# Patient Record
Sex: MALE | Race: OTHER | HISPANIC OR LATINO
[De-identification: names, ages, dates, MRNs, and addresses within clinical notes are randomized per-mention and may not be internally consistent; named-entity substitution may affect disease eponyms.]

---

## 2023-03-20 PROBLEM — Z00.00 ENCOUNTER FOR PREVENTIVE HEALTH EXAMINATION: Status: ACTIVE | Noted: 2023-03-20

## 2023-03-22 ENCOUNTER — RESULT REVIEW (OUTPATIENT)
Age: 63
End: 2023-03-22

## 2023-03-22 ENCOUNTER — APPOINTMENT (OUTPATIENT)
Dept: ORTHOPEDIC SURGERY | Facility: CLINIC | Age: 63
End: 2023-03-22
Payer: MEDICAID

## 2023-03-22 ENCOUNTER — OUTPATIENT (OUTPATIENT)
Dept: OUTPATIENT SERVICES | Facility: HOSPITAL | Age: 63
LOS: 1 days | End: 2023-03-22
Payer: MEDICAID

## 2023-03-22 VITALS
OXYGEN SATURATION: 97 % | SYSTOLIC BLOOD PRESSURE: 120 MMHG | DIASTOLIC BLOOD PRESSURE: 70 MMHG | BODY MASS INDEX: 29.19 KG/M2 | HEIGHT: 67 IN | HEART RATE: 66 BPM | WEIGHT: 186 LBS

## 2023-03-22 DIAGNOSIS — Z78.9 OTHER SPECIFIED HEALTH STATUS: ICD-10-CM

## 2023-03-22 DIAGNOSIS — Z01.818 ENCOUNTER FOR OTHER PREPROCEDURAL EXAMINATION: ICD-10-CM

## 2023-03-22 DIAGNOSIS — Z86.39 PERSONAL HISTORY OF OTHER ENDOCRINE, NUTRITIONAL AND METABOLIC DISEASE: ICD-10-CM

## 2023-03-22 DIAGNOSIS — Z60.2 PROBLEMS RELATED TO LIVING ALONE: ICD-10-CM

## 2023-03-22 LAB
A1C WITH ESTIMATED AVERAGE GLUCOSE RESULT: 5.6 % — SIGNIFICANT CHANGE UP (ref 4–5.6)
ALBUMIN SERPL ELPH-MCNC: 4.9 G/DL — SIGNIFICANT CHANGE UP (ref 3.3–5)
ALP SERPL-CCNC: 111 U/L — SIGNIFICANT CHANGE UP (ref 40–120)
ALT FLD-CCNC: 17 U/L — SIGNIFICANT CHANGE UP (ref 10–45)
ANION GAP SERPL CALC-SCNC: 11 MMOL/L — SIGNIFICANT CHANGE UP (ref 5–17)
APPEARANCE UR: CLEAR — SIGNIFICANT CHANGE UP
APTT BLD: 29.6 SEC — SIGNIFICANT CHANGE UP (ref 27.5–35.5)
AST SERPL-CCNC: 18 U/L — SIGNIFICANT CHANGE UP (ref 10–40)
BASOPHILS # BLD AUTO: 0.03 K/UL — SIGNIFICANT CHANGE UP (ref 0–0.2)
BASOPHILS NFR BLD AUTO: 0.6 % — SIGNIFICANT CHANGE UP (ref 0–2)
BILIRUB SERPL-MCNC: 0.3 MG/DL — SIGNIFICANT CHANGE UP (ref 0.2–1.2)
BILIRUB UR-MCNC: NEGATIVE — SIGNIFICANT CHANGE UP
BUN SERPL-MCNC: 20 MG/DL — SIGNIFICANT CHANGE UP (ref 7–23)
CALCIUM SERPL-MCNC: 10 MG/DL — SIGNIFICANT CHANGE UP (ref 8.4–10.5)
CHLORIDE SERPL-SCNC: 105 MMOL/L — SIGNIFICANT CHANGE UP (ref 96–108)
CO2 SERPL-SCNC: 27 MMOL/L — SIGNIFICANT CHANGE UP (ref 22–31)
COLOR SPEC: YELLOW — SIGNIFICANT CHANGE UP
CREAT SERPL-MCNC: 0.98 MG/DL — SIGNIFICANT CHANGE UP (ref 0.5–1.3)
DIFF PNL FLD: NEGATIVE — SIGNIFICANT CHANGE UP
EGFR: 88 ML/MIN/1.73M2 — SIGNIFICANT CHANGE UP
EOSINOPHIL # BLD AUTO: 0.09 K/UL — SIGNIFICANT CHANGE UP (ref 0–0.5)
EOSINOPHIL NFR BLD AUTO: 1.7 % — SIGNIFICANT CHANGE UP (ref 0–6)
ESTIMATED AVERAGE GLUCOSE: 114 MG/DL — SIGNIFICANT CHANGE UP (ref 68–114)
GLUCOSE SERPL-MCNC: 102 MG/DL — HIGH (ref 70–99)
GLUCOSE UR QL: NEGATIVE — SIGNIFICANT CHANGE UP
HCT VFR BLD CALC: 42.9 % — SIGNIFICANT CHANGE UP (ref 39–50)
HGB BLD-MCNC: 14.3 G/DL — SIGNIFICANT CHANGE UP (ref 13–17)
IMM GRANULOCYTES NFR BLD AUTO: 0.2 % — SIGNIFICANT CHANGE UP (ref 0–0.9)
INR BLD: 0.98 — SIGNIFICANT CHANGE UP (ref 0.88–1.16)
KETONES UR-MCNC: NEGATIVE — SIGNIFICANT CHANGE UP
LEUKOCYTE ESTERASE UR-ACNC: NEGATIVE — SIGNIFICANT CHANGE UP
LYMPHOCYTES # BLD AUTO: 1.29 K/UL — SIGNIFICANT CHANGE UP (ref 1–3.3)
LYMPHOCYTES # BLD AUTO: 23.7 % — SIGNIFICANT CHANGE UP (ref 13–44)
MCHC RBC-ENTMCNC: 29.8 PG — SIGNIFICANT CHANGE UP (ref 27–34)
MCHC RBC-ENTMCNC: 33.3 GM/DL — SIGNIFICANT CHANGE UP (ref 32–36)
MCV RBC AUTO: 89.4 FL — SIGNIFICANT CHANGE UP (ref 80–100)
MONOCYTES # BLD AUTO: 0.42 K/UL — SIGNIFICANT CHANGE UP (ref 0–0.9)
MONOCYTES NFR BLD AUTO: 7.7 % — SIGNIFICANT CHANGE UP (ref 2–14)
NEUTROPHILS # BLD AUTO: 3.6 K/UL — SIGNIFICANT CHANGE UP (ref 1.8–7.4)
NEUTROPHILS NFR BLD AUTO: 66.1 % — SIGNIFICANT CHANGE UP (ref 43–77)
NITRITE UR-MCNC: NEGATIVE — SIGNIFICANT CHANGE UP
NRBC # BLD: 0 /100 WBCS — SIGNIFICANT CHANGE UP (ref 0–0)
PH UR: 5 — SIGNIFICANT CHANGE UP (ref 5–8)
PLATELET # BLD AUTO: 237 K/UL — SIGNIFICANT CHANGE UP (ref 150–400)
POTASSIUM SERPL-MCNC: 5.2 MMOL/L — SIGNIFICANT CHANGE UP (ref 3.5–5.3)
POTASSIUM SERPL-SCNC: 5.2 MMOL/L — SIGNIFICANT CHANGE UP (ref 3.5–5.3)
PROT SERPL-MCNC: 7.5 G/DL — SIGNIFICANT CHANGE UP (ref 6–8.3)
PROT UR-MCNC: NEGATIVE MG/DL — SIGNIFICANT CHANGE UP
PROTHROM AB SERPL-ACNC: 11.6 SEC — SIGNIFICANT CHANGE UP (ref 10.5–13.4)
RBC # BLD: 4.8 M/UL — SIGNIFICANT CHANGE UP (ref 4.2–5.8)
RBC # FLD: 12.9 % — SIGNIFICANT CHANGE UP (ref 10.3–14.5)
SODIUM SERPL-SCNC: 143 MMOL/L — SIGNIFICANT CHANGE UP (ref 135–145)
SP GR SPEC: 1.02 — SIGNIFICANT CHANGE UP (ref 1–1.03)
UROBILINOGEN FLD QL: 0.2 E.U./DL — SIGNIFICANT CHANGE UP
WBC # BLD: 5.44 K/UL — SIGNIFICANT CHANGE UP (ref 3.8–10.5)
WBC # FLD AUTO: 5.44 K/UL — SIGNIFICANT CHANGE UP (ref 3.8–10.5)

## 2023-03-22 PROCEDURE — 99205 OFFICE O/P NEW HI 60 MIN: CPT

## 2023-03-22 PROCEDURE — 73590 X-RAY EXAM OF LOWER LEG: CPT | Mod: 26,LT

## 2023-03-22 PROCEDURE — 85025 COMPLETE CBC W/AUTO DIFF WBC: CPT

## 2023-03-22 PROCEDURE — 93010 ELECTROCARDIOGRAM REPORT: CPT | Mod: NC

## 2023-03-22 PROCEDURE — 85610 PROTHROMBIN TIME: CPT

## 2023-03-22 PROCEDURE — 71046 X-RAY EXAM CHEST 2 VIEWS: CPT

## 2023-03-22 PROCEDURE — 87086 URINE CULTURE/COLONY COUNT: CPT

## 2023-03-22 PROCEDURE — 81003 URINALYSIS AUTO W/O SCOPE: CPT

## 2023-03-22 PROCEDURE — 73564 X-RAY EXAM KNEE 4 OR MORE: CPT

## 2023-03-22 PROCEDURE — 80053 COMPREHEN METABOLIC PANEL: CPT

## 2023-03-22 PROCEDURE — 83036 HEMOGLOBIN GLYCOSYLATED A1C: CPT

## 2023-03-22 PROCEDURE — 73590 X-RAY EXAM OF LOWER LEG: CPT

## 2023-03-22 PROCEDURE — 73564 X-RAY EXAM KNEE 4 OR MORE: CPT | Mod: 26,LT,RT

## 2023-03-22 PROCEDURE — 85730 THROMBOPLASTIN TIME PARTIAL: CPT

## 2023-03-22 PROCEDURE — 93005 ELECTROCARDIOGRAM TRACING: CPT

## 2023-03-22 PROCEDURE — 71046 X-RAY EXAM CHEST 2 VIEWS: CPT | Mod: 26

## 2023-03-22 SDOH — SOCIAL STABILITY - SOCIAL INSECURITY: PROBLEMS RELATED TO LIVING ALONE: Z60.2

## 2023-03-23 PROBLEM — Z78.9 NON-SMOKER: Status: ACTIVE | Noted: 2023-03-22

## 2023-03-23 PROBLEM — Z86.39 HISTORY OF DIABETES MELLITUS: Status: RESOLVED | Noted: 2023-03-22 | Resolved: 2023-03-23

## 2023-03-23 PROBLEM — Z60.2 LIVES ALONE: Status: ACTIVE | Noted: 2023-03-22

## 2023-03-23 LAB
CULTURE RESULTS: SIGNIFICANT CHANGE UP
SPECIMEN SOURCE: SIGNIFICANT CHANGE UP

## 2023-03-23 RX ORDER — IBUPROFEN 800 MG/1
TABLET, FILM COATED ORAL
Refills: 0 | Status: ACTIVE | COMMUNITY

## 2023-03-23 NOTE — HISTORY OF PRESENT ILLNESS
[8] : a current pain level of 8/10 [Walking] : worsened by walking [NSAIDs] : relieved by nonsteroidal anti-inflammatory drugs [Rest] : relieved by rest [de-identified] : 03/22/2023: 63 y/o [place] speaking male presenting for evaluation of right knee pain. He has been having this pain for the past 5 years and it is progressively worsening. He rates his pain as 8/10. He localizes the pain to the entire knee, worse with walking, and is taking Tylenol and ibuprofen as needed for pain. He notes a walking distance limitation of up to 3 blocks with a cane before having to stop and take a break. Past interventions include PT over 2 years ago which did not provide good relief. He has had 3 right knee CSI injections 2 years ago which also did not provide much relief. He would like to discuss right TKA today.\par \par PMHx includes left knee fracture 3 months ago following a misstep on stairs, prediabetes. He denies allergies and he currently lives alone in a house.  [de-identified] : pt states pain is achy

## 2023-03-23 NOTE — PHYSICAL EXAM
[de-identified] :  General appearance: well nourished and hydrated, pleasant, alert and oriented x 3, cooperative.  \par HEENT: normocephalic, EOM intact, wearing mask, external auditory canal clear.  \par Cardiovascular: no lower leg edema, no varicosities, dorsalis pedis pulses palpable and symmetric.  \par Lymphatics: no palpable lymphadenopathy, no lymphedema.  \par Neurologic: sensation is normal, no muscle weakness in upper or lower extremities, patella tendon reflexes present and symmetric.  \par Dermatologic: skin moist, warm, no rash.  \par Spine: cervical spine with normal lordosis and painless range of motion, thoracic spine with normal kyphosis and painless range of motion, lumbosacral spine with normal lordosis and painless range of motion.  No tenderness to palpation along midline spine and paraspinal musculature.  Sacroiliac joints nontender bilaterally. Negative SLR and crossed SLR tests bilaterally.\par Gait: presents using a cane and displays b/l varus thrust, more pronounced on the right as well as right sided antalgia\par \par Left knee: \par - Focal soft tissue swelling: none\par - Ecchymosis: none\par - Erythema: none\par - Effusion: none, no Baker's cyst\par - Wounds: well healed suprapatellar nailing incision as well as stab incisions for the interlocking screws, all well healed and benign appearing\par - Alignment: marked varus\par - Tenderness: none\par - ROM: 0-120\par - Collateral laxity: none\par - Cruciate laxity: none\par - Popliteal angle (degrees): 10\par - Quad strength: 5/5\par \par Right knee:\par - Focal soft tissue swelling: none\par - Ecchymosis: none\par - Erythema: none\par - Effusion: none, no Baker's cyst\par - Wounds: none\par - Alignment: marked varus\par - Tenderness: none\par - ROM: 5-110\par - Collateral laxity: pseudolaxity to varus\par - Cruciate laxity: none\par - Popliteal angle (degrees): 25\par - Quad strength: 5/5 [de-identified] : 4 radiographic views were taken of the b/l knees which demonstrate for the right knee marked varus malalignment, tricompartmental OA most pronounced medially with bone on bone articulation, KL 4, with an associated osteochondral defect of the medial femoral condyle with flattening of the condyle and a sharp transition zone. Patella sits at normal height and tracks centrally. The patella does appear to be bipartite superolateral. Left knee demonstrates also marked varus malalignment with lateral tibial subluxation, tricompartmental OA most pronounced medially with bone on bone articulation, KL 4. Patella sits at normal height and tracks centrally, also bipartite at the superolateral aspect. An intramedullary rosanna is visualized within the tibia with 2 proximal interfering screws. The rosanna appears to be in normal position without evidence of hardware complication. \par \par AP and lateral views were also taken of the left tibia/fibula. This demonstrates the rest of the tibial nail which appears to be in good position without evidence of mechanical complication. A spiral fracture of the distal half of the tibial diaphysis is noted, well aligned with ossifying callus.

## 2023-03-23 NOTE — ADDENDUM
[FreeTextEntry1] : Documented by Liz Zavala acting as a scribe for Dr. Nickolas Sosa on 03/22/2023.

## 2023-03-23 NOTE — END OF VISIT
[FreeTextEntry3] : All medical record entries made by the Scribe were at my, Dr. Nickolas Sosa, direction and personally dictated by me on 03/22/2023. I have reviewed the chart and agree that the record accurately reflects my personal performance of the history, physical exam, assessment and plan. I have also personally directed, reviewed, and agreed with the chart.

## 2023-03-23 NOTE — DISCUSSION/SUMMARY
[de-identified] : 63 y/o male with b/l knee severe osteoarthritis, only right side symptomatic at this time, with associated right medial femoral condyle osteochondral defect and 3 months s/p left tibia intramedullary nailing for fracture. \par - He is indicated at this time for right total knee arthroplasty with Nelsy robotic assistance. \par - We discussed the details of the procedure, the expected recovery period, and the expected outcome. We discussed the likelihood of satisfaction after complete recovery, and the potential causes of dissatisfaction. The importance of active patient participation in the rehabilitation protocol was emphasized, along with its influence on short and long-term outcomes. Specific risks of total knee replacement were discussed in detail. We discussed the risk of surgical site complications including but not limited to: surgical site infection, wound healing complications, bone fracture, tendon or ligament injury, neurovascular injury, hemorrhage, postoperative stiffness or instability, persistent pain and need for reoperation or manipulation under anesthesia. We discussed surgical blood loss and the possible need for blood transfusion. We discussed the risk of perioperative medical complications, including but not limited to catheter-associated urinary tract infection, venous thromboembolism and other cardiopulmonary complications. We discussed anesthetic options and the risk of anesthesia-related complications. We discussed implant fixation methods; my plan would be to use fully cemented fixation in this case. We discussed the variable need to resurface the patella; my plan would be to resurface in this case. We discussed the durability of prosthetic knees and limitations related to wear, osteolysis and loosening.  We discussed the role of the robot in helping to guide bone resections and measure alignment and soft tissue balance. All questions were answered the patient's satisfaction. The patient was given a copy of my preoperative packet with additional information about the procedure. I asked the patient to either call back or schedule a followup appointment for any additional questions or concerns regarding the procedure. \par - We discussed that given his history of lifelong constitutional varus, I would aim to correct approximately 75% of his current varus deformity which would still be straighter than the left knee. \par - We reviewed that his left knee is currently minimally symptomatic and I would not consider arthroplasty at this time, but given the bone on bone arthritis, we would continue to observe this over time and he may become a candidate for left total knee arthroplasty if symptoms progress from here. \par - He expressed a strong desire to go to subacute rehabilitation post surgery, citing that he lives alone by himself on a second floor apartment and has very little income. He had a good experience in [facility] from his left tibia recovery and he would like to try to get back into the same facility. I counseled him that I would recommend against subacute rehabilitation, however given his strenuous and repeated requests, we will also revisit this with the hospital  postop. \par - He will be booked for surgery at a convenient time with routine medical clearance. He is a [place] speaker and will not be a candidate for any of the current clinical trials.

## 2023-03-27 ENCOUNTER — APPOINTMENT (OUTPATIENT)
Dept: ORTHOPEDIC SURGERY | Facility: CLINIC | Age: 63
End: 2023-03-27
Payer: MEDICAID

## 2023-03-27 VITALS
OXYGEN SATURATION: 99 % | HEART RATE: 65 BPM | TEMPERATURE: 98.6 F | HEIGHT: 67 IN | DIASTOLIC BLOOD PRESSURE: 79 MMHG | SYSTOLIC BLOOD PRESSURE: 134 MMHG | BODY MASS INDEX: 29.19 KG/M2 | WEIGHT: 186 LBS

## 2023-03-27 PROCEDURE — 99213 OFFICE O/P EST LOW 20 MIN: CPT

## 2023-03-28 NOTE — ASSESSMENT
[Patient Optimized for Surgery] : Patient optimized for surgery [No Further Testing Recommended] : no further testing recommended [As per surgery] : as per surgery [FreeTextEntry4] : KWAKU POST is a 62 year old male presents for preoperative medical evaluation: \par Fowler score 0\par RCRI class I - 3.9% 30-day risk of death, MI or cardiac arrest\par 7-10 METs\par Labs: CBC, CMP, Coags, A1c all wnl (3/22/23)\par ECG; NSR 62 bpm (3/22/23)\par CXR: Biapical nodular pleural thickening thought to be postinflammatory and chronic.  Linear opacity over the lateral aspect of the right upper lung zone compatible with a focus of scarring and/or atelectasis.\par There is some mild medial diaphragm elevation.  No consolidation.  There are no pleural effusions.  There is no pneumothorax.  The cardiomediastinal silhouette, bones and soft tissues are unremarkable. (3/22/23)\par \par Given the above, the patient is medically optimized for surgery.\par \par Plan: \par Avoid Intra-/alexandria-operative hypotension.\par Prophylactic antibiotics recommended.\par Stop Ibuprofen, vitamins, supplements and herbal therapies at least 5 days prior to procedure. \par Ok to c/w acetaminophen prn.\par DVT prophylaxis and early mobilization is recommended.\par Early use of incentive spirometry recommended.\par All questions answered and patient verbalized understanding.

## 2023-03-28 NOTE — PHYSICAL EXAM
[No Acute Distress] : no acute distress [Well Nourished] : well nourished [Well Developed] : well developed [Well-Appearing] : well-appearing [Normal Sclera/Conjunctiva] : normal sclera/conjunctiva [PERRL] : pupils equal round and reactive to light [EOMI] : extraocular movements intact [Normal Outer Ear/Nose] : the outer ears and nose were normal in appearance [Normal Oropharynx] : the oropharynx was normal [No JVD] : no jugular venous distention [No Lymphadenopathy] : no lymphadenopathy [Supple] : supple [Thyroid Normal, No Nodules] : the thyroid was normal and there were no nodules present [No Respiratory Distress] : no respiratory distress  [No Accessory Muscle Use] : no accessory muscle use [Clear to Auscultation] : lungs were clear to auscultation bilaterally [Normal Rate] : normal rate  [Regular Rhythm] : with a regular rhythm [Normal S1, S2] : normal S1 and S2 [No Murmur] : no murmur heard [No Carotid Bruits] : no carotid bruits [No Abdominal Bruit] : a ~M bruit was not heard ~T in the abdomen [No Varicosities] : no varicosities [Pedal Pulses Present] : the pedal pulses are present [No Edema] : there was no peripheral edema [No Palpable Aorta] : no palpable aorta [No Extremity Clubbing/Cyanosis] : no extremity clubbing/cyanosis [Soft] : abdomen soft [Non Tender] : non-tender [Non-distended] : non-distended [No Masses] : no abdominal mass palpated [No HSM] : no HSM [Normal Bowel Sounds] : normal bowel sounds [Normal Posterior Cervical Nodes] : no posterior cervical lymphadenopathy [Normal Anterior Cervical Nodes] : no anterior cervical lymphadenopathy [No CVA Tenderness] : no CVA  tenderness [No Spinal Tenderness] : no spinal tenderness [No Rash] : no rash [Coordination Grossly Intact] : coordination grossly intact [No Focal Deficits] : no focal deficits [Normal Gait] : normal gait [Deep Tendon Reflexes (DTR)] : deep tendon reflexes were 2+ and symmetric [Normal Affect] : the affect was normal [Normal Insight/Judgement] : insight and judgment were intact [de-identified] : Gait antalgic, genu varus bilaterally, medial joint  to palpation, ROM 5-95 with pain at end of ROM

## 2023-03-28 NOTE — HISTORY OF PRESENT ILLNESS
[No Pertinent Cardiac History] : no history of aortic stenosis, atrial fibrillation, coronary artery disease, recent myocardial infarction, or implantable device/pacemaker [No Pertinent Pulmonary History] : no history of asthma, COPD, sleep apnea, or smoking [No Adverse Anesthesia Reaction] : no adverse anesthesia reaction in self or family member [(Patient denies any chest pain, claudication, dyspnea on exertion, orthopnea, palpitations or syncope)] : Patient denies any chest pain, claudication, dyspnea on exertion, orthopnea, palpitations or syncope [Good (7-10 METs)] : Good (7-10 METs) [Chronic Anticoagulation] : no chronic anticoagulation [Chronic Kidney Disease] : no chronic kidney disease [Diabetes] : no diabetes [NSAIDs: _____] : NSAIDs: [unfilled] [FreeTextEntry1] : Right total knee arthroplasty [FreeTextEntry2] : 4/10/23 [FreeTextEntry3] : Dr. Nickolas Sosa [FreeTextEntry4] : KWAKU POST is a 62 year old male w/ PMHx left tibia fracture 2022 s/p ORIF and bilateral kneeOA who presents for preprocedural evaluation. \par \par Lives with 3 roommates in second floor in government housing and gets meals from the local mission.\par Plans to have inpatient rehab at [facility] [FreeTextEntry8] : Limited by knee pain

## 2023-04-07 VITALS
WEIGHT: 173.72 LBS | HEIGHT: 67 IN | RESPIRATION RATE: 16 BRPM | SYSTOLIC BLOOD PRESSURE: 118 MMHG | HEART RATE: 70 BPM | TEMPERATURE: 98 F | DIASTOLIC BLOOD PRESSURE: 65 MMHG

## 2023-04-07 RX ORDER — ASPIRIN 81 MG/1
81 TABLET ORAL
Qty: 60 | Refills: 0 | Status: ACTIVE | COMMUNITY
Start: 2023-04-07 | End: 1900-01-01

## 2023-04-07 RX ORDER — POVIDONE-IODINE 5 %
1 AEROSOL (ML) TOPICAL ONCE
Refills: 0 | Status: COMPLETED | OUTPATIENT
Start: 2023-04-10 | End: 2023-04-10

## 2023-04-07 RX ORDER — OXYCODONE 5 MG/1
5 TABLET ORAL
Qty: 50 | Refills: 0 | Status: ACTIVE | COMMUNITY
Start: 2023-04-07 | End: 1900-01-01

## 2023-04-07 RX ORDER — MORPHINE SULFATE 15 MG/1
15 TABLET, FILM COATED, EXTENDED RELEASE ORAL
Qty: 28 | Refills: 0 | Status: ACTIVE | COMMUNITY
Start: 2023-04-07 | End: 1900-01-01

## 2023-04-07 RX ORDER — ACETAMINOPHEN 500 MG/1
500 TABLET ORAL
Qty: 180 | Refills: 2 | Status: ACTIVE | COMMUNITY
Start: 2023-04-07 | End: 1900-01-01

## 2023-04-07 RX ORDER — PANTOPRAZOLE 40 MG/1
40 TABLET, DELAYED RELEASE ORAL DAILY
Qty: 30 | Refills: 2 | Status: ACTIVE | COMMUNITY
Start: 2023-04-07 | End: 1900-01-01

## 2023-04-07 NOTE — H&P ADULT - NSICDXPASTMEDICALHX_GEN_ALL_CORE_FT
PAST MEDICAL HISTORY:  Diabetes     Osteoarthritis      PAST MEDICAL HISTORY:  Osteoarthritis     Prediabetes

## 2023-04-07 NOTE — H&P ADULT - PROBLEM SELECTOR PLAN 1
Admit to Orthopaedic Service.  Presents today for elective right total knee arthroplasty  Pt medically stable and cleared for procedure today by Dr. Wilks

## 2023-04-07 NOTE — H&P ADULT - HISTORY OF PRESENT ILLNESS
61yo M with right knee pain x     Presents today for elective right total knee arthroplasty History provided with help of  [id number].    63yo M with right knee pain x several years. He does reports recent injury to the LEFT LEG, with ORIF for the left tibia at [facility] in December. He was on Lovenox postoperatively which he has not taken since February. For the right knee, he has tried injections, PT, medications, rest and time without relief. He denies numbness or tingling. He does currently use a cane.    Denies PMHx of DM2, DVT/PE, MI or stroke.     Presents today for elective right total knee arthroplasty

## 2023-04-07 NOTE — ASU PATIENT PROFILE, ADULT - FALL HARM RISK - RISK INTERVENTIONS

## 2023-04-07 NOTE — H&P ADULT - NSHPLABSRESULTS_GEN_ALL_CORE
Preop CBC, BMP, PT/INR, PTT within normal range and reviewed per medical clearance  Cr- 0.98  A1c: 5.6  UA: negative  Preop CXR 3/22 reviewed per medical clearance   Preop EKG 3/22 HR: 72bpm, NSR, reviewed per medical clearance  3M: MELLO  COVID: negative 4/5/23

## 2023-04-07 NOTE — H&P ADULT - NSHPPHYSICALEXAM_GEN_ALL_CORE
MSK: Decreased right knee ROM secondary to pain      Rest of PE per MD clearance Gen: 62M NAD  MSK: Decreased lumbar ROM secondary to pain  LE skin without erythema, ecchymosis, abrasions or lesions, signs of infection; left leg well healed surgical incisions  Calves soft, nontender bilaterally   Sensation intact to light touch bilateral lower extremities  Pulses: DP2+ bilat LE; brisk capillary refill  EHL/FHL/TA/GS 5/5 bilaterally   AIN/PIN/ulnar/median/radial intact bilaterally    Rest of PE per MD clearance

## 2023-04-07 NOTE — ASU PATIENT PROFILE, ADULT - FALL HARM RISK - UNIVERSAL INTERVENTIONS
Bed in lowest position, wheels locked, appropriate side rails in place/Call bell, personal items and telephone in reach/Instruct patient to call for assistance before getting out of bed or chair/Non-slip footwear when patient is out of bed/[place] to call system/Physically safe environment - no spills, clutter or unnecessary equipment/Purposeful Proactive Rounding/Room/bathroom lighting operational, light cord in reach

## 2023-04-09 ENCOUNTER — TRANSCRIPTION ENCOUNTER (OUTPATIENT)
Age: 63
End: 2023-04-09

## 2023-04-10 ENCOUNTER — APPOINTMENT (OUTPATIENT)
Dept: ORTHOPEDIC SURGERY | Facility: HOSPITAL | Age: 63
End: 2023-04-10

## 2023-04-10 ENCOUNTER — RESULT REVIEW (OUTPATIENT)
Age: 63
End: 2023-04-10

## 2023-04-10 ENCOUNTER — INPATIENT (INPATIENT)
Facility: HOSPITAL | Age: 63
LOS: 3 days | Discharge: NURSING, REHAB OR LONG-TERM CARE | DRG: 470 | End: 2023-04-14
Attending: ORTHOPAEDIC SURGERY | Admitting: ORTHOPAEDIC SURGERY
Payer: MEDICAID

## 2023-04-10 DIAGNOSIS — M19.90 UNSPECIFIED OSTEOARTHRITIS, UNSPECIFIED SITE: ICD-10-CM

## 2023-04-10 DIAGNOSIS — Z98.890 OTHER SPECIFIED POSTPROCEDURAL STATES: Chronic | ICD-10-CM

## 2023-04-10 DIAGNOSIS — E11.9 TYPE 2 DIABETES MELLITUS WITHOUT COMPLICATIONS: ICD-10-CM

## 2023-04-10 LAB — GLUCOSE BLDC GLUCOMTR-MCNC: 97 MG/DL — SIGNIFICANT CHANGE UP (ref 70–99)

## 2023-04-10 PROCEDURE — 27447 TOTAL KNEE ARTHROPLASTY: CPT | Mod: RT

## 2023-04-10 PROCEDURE — S2900 ROBOTIC SURGICAL SYSTEM: CPT | Mod: NC

## 2023-04-10 PROCEDURE — 73560 X-RAY EXAM OF KNEE 1 OR 2: CPT | Mod: 26,RT

## 2023-04-10 DEVICE — FEM PERSONA PS CMT CCR STD SZ 9 R: Type: IMPLANTABLE DEVICE | Status: FUNCTIONAL

## 2023-04-10 DEVICE — CELLERATE SURGICAL POWDER RX 5GM: Type: IMPLANTABLE DEVICE | Status: FUNCTIONAL

## 2023-04-10 DEVICE — PIN CAS FIX 3.2X150MM: Type: IMPLANTABLE DEVICE | Status: FUNCTIONAL

## 2023-04-10 DEVICE — PIN FIX CAS 3.2X80MM STR: Type: IMPLANTABLE DEVICE | Status: FUNCTIONAL

## 2023-04-10 DEVICE — SURF ART PERSONA RT 6-9 EF 10MM: Type: IMPLANTABLE DEVICE | Status: FUNCTIONAL

## 2023-04-10 DEVICE — ZIMMER FEMALE HEX SCREW MAGNETIC 2.5MM X 25MM: Type: IMPLANTABLE DEVICE | Status: FUNCTIONAL

## 2023-04-10 DEVICE — ZIMMER/NEXGEN HEX HEAD SCREW 3.5MM: Type: IMPLANTABLE DEVICE | Status: FUNCTIONAL

## 2023-04-10 DEVICE — CEMENT PALACOS R: Type: IMPLANTABLE DEVICE | Status: FUNCTIONAL

## 2023-04-10 DEVICE — ZIMMER/NEXGEN SMOOTH PIN 3.2X75MM: Type: IMPLANTABLE DEVICE | Status: FUNCTIONAL

## 2023-04-10 DEVICE — STEM TIB PSN 5 DEG SZ F R: Type: IMPLANTABLE DEVICE | Status: FUNCTIONAL

## 2023-04-10 RX ORDER — CELECOXIB 200 MG/1
400 CAPSULE ORAL ONCE
Refills: 0 | Status: COMPLETED | OUTPATIENT
Start: 2023-04-10 | End: 2023-04-10

## 2023-04-10 RX ORDER — CHLORHEXIDINE GLUCONATE 213 G/1000ML
1 SOLUTION TOPICAL ONCE
Refills: 0 | Status: COMPLETED | OUTPATIENT
Start: 2023-04-10 | End: 2023-04-10

## 2023-04-10 RX ORDER — HYDROMORPHONE HYDROCHLORIDE 2 MG/ML
0.5 INJECTION INTRAMUSCULAR; INTRAVENOUS; SUBCUTANEOUS
Refills: 0 | Status: DISCONTINUED | OUTPATIENT
Start: 2023-04-10 | End: 2023-04-10

## 2023-04-10 RX ORDER — HYDROMORPHONE HYDROCHLORIDE 2 MG/ML
0.5 INJECTION INTRAMUSCULAR; INTRAVENOUS; SUBCUTANEOUS EVERY 4 HOURS
Refills: 0 | Status: DISCONTINUED | OUTPATIENT
Start: 2023-04-10 | End: 2023-04-14

## 2023-04-10 RX ORDER — MAGNESIUM HYDROXIDE 400 MG/1
30 TABLET, CHEWABLE ORAL DAILY
Refills: 0 | Status: DISCONTINUED | OUTPATIENT
Start: 2023-04-10 | End: 2023-04-14

## 2023-04-10 RX ORDER — TRAMADOL HYDROCHLORIDE 50 MG/1
50 TABLET ORAL EVERY 6 HOURS
Refills: 0 | Status: DISCONTINUED | OUTPATIENT
Start: 2023-04-10 | End: 2023-04-13

## 2023-04-10 RX ORDER — OXYCODONE HYDROCHLORIDE 5 MG/1
5 TABLET ORAL EVERY 4 HOURS
Refills: 0 | Status: DISCONTINUED | OUTPATIENT
Start: 2023-04-10 | End: 2023-04-14

## 2023-04-10 RX ORDER — PANTOPRAZOLE SODIUM 20 MG/1
40 TABLET, DELAYED RELEASE ORAL
Refills: 0 | Status: DISCONTINUED | OUTPATIENT
Start: 2023-04-10 | End: 2023-04-14

## 2023-04-10 RX ORDER — APREPITANT 80 MG/1
40 CAPSULE ORAL ONCE
Refills: 0 | Status: COMPLETED | OUTPATIENT
Start: 2023-04-10 | End: 2023-04-10

## 2023-04-10 RX ORDER — SODIUM CHLORIDE 9 MG/ML
1000 INJECTION, SOLUTION INTRAVENOUS
Refills: 0 | Status: DISCONTINUED | OUTPATIENT
Start: 2023-04-11 | End: 2023-04-14

## 2023-04-10 RX ORDER — POLYETHYLENE GLYCOL 3350 17 G/17G
17 POWDER, FOR SOLUTION ORAL AT BEDTIME
Refills: 0 | Status: DISCONTINUED | OUTPATIENT
Start: 2023-04-10 | End: 2023-04-14

## 2023-04-10 RX ORDER — ACETAMINOPHEN 500 MG
1000 TABLET ORAL ONCE
Refills: 0 | Status: COMPLETED | OUTPATIENT
Start: 2023-04-10 | End: 2023-04-10

## 2023-04-10 RX ORDER — ACETAMINOPHEN 500 MG
975 TABLET ORAL EVERY 8 HOURS
Refills: 0 | Status: DISCONTINUED | OUTPATIENT
Start: 2023-04-10 | End: 2023-04-14

## 2023-04-10 RX ORDER — SENNA PLUS 8.6 MG/1
2 TABLET ORAL AT BEDTIME
Refills: 0 | Status: DISCONTINUED | OUTPATIENT
Start: 2023-04-10 | End: 2023-04-14

## 2023-04-10 RX ORDER — OXYCODONE HYDROCHLORIDE 5 MG/1
10 TABLET ORAL EVERY 4 HOURS
Refills: 0 | Status: DISCONTINUED | OUTPATIENT
Start: 2023-04-10 | End: 2023-04-14

## 2023-04-10 RX ORDER — KETOROLAC TROMETHAMINE 30 MG/ML
15 SYRINGE (ML) INJECTION EVERY 6 HOURS
Refills: 0 | Status: DISCONTINUED | OUTPATIENT
Start: 2023-04-10 | End: 2023-04-11

## 2023-04-10 RX ORDER — ASPIRIN/CALCIUM CARB/MAGNESIUM 324 MG
81 TABLET ORAL
Refills: 0 | Status: DISCONTINUED | OUTPATIENT
Start: 2023-04-11 | End: 2023-04-14

## 2023-04-10 RX ORDER — HYDROMORPHONE HYDROCHLORIDE 2 MG/ML
0.5 INJECTION INTRAMUSCULAR; INTRAVENOUS; SUBCUTANEOUS
Refills: 0 | Status: COMPLETED | OUTPATIENT
Start: 2023-04-10

## 2023-04-10 RX ORDER — CEFAZOLIN SODIUM 1 G
2000 VIAL (EA) INJECTION EVERY 8 HOURS
Refills: 0 | Status: COMPLETED | OUTPATIENT
Start: 2023-04-10 | End: 2023-04-11

## 2023-04-10 RX ORDER — ONDANSETRON 8 MG/1
4 TABLET, FILM COATED ORAL EVERY 6 HOURS
Refills: 0 | Status: DISCONTINUED | OUTPATIENT
Start: 2023-04-10 | End: 2023-04-14

## 2023-04-10 RX ORDER — CELECOXIB 200 MG/1
200 CAPSULE ORAL EVERY 12 HOURS
Refills: 0 | Status: DISCONTINUED | OUTPATIENT
Start: 2023-04-11 | End: 2023-04-14

## 2023-04-10 RX ORDER — HYDROMORPHONE HYDROCHLORIDE 2 MG/ML
0.5 INJECTION INTRAMUSCULAR; INTRAVENOUS; SUBCUTANEOUS EVERY 4 HOURS
Refills: 0 | Status: DISCONTINUED | OUTPATIENT
Start: 2023-04-10 | End: 2023-04-10

## 2023-04-10 RX ADMIN — Medication 1000 MILLIGRAM(S): at 08:55

## 2023-04-10 RX ADMIN — CELECOXIB 400 MILLIGRAM(S): 200 CAPSULE ORAL at 08:54

## 2023-04-10 RX ADMIN — Medication 15 MILLIGRAM(S): at 18:41

## 2023-04-10 RX ADMIN — OXYCODONE HYDROCHLORIDE 10 MILLIGRAM(S): 5 TABLET ORAL at 23:30

## 2023-04-10 RX ADMIN — APREPITANT 40 MILLIGRAM(S): 80 CAPSULE ORAL at 08:57

## 2023-04-10 RX ADMIN — Medication 100 MILLIGRAM(S): at 23:30

## 2023-04-10 RX ADMIN — Medication 15 MILLIGRAM(S): at 18:53

## 2023-04-10 RX ADMIN — Medication 15 MILLIGRAM(S): at 23:30

## 2023-04-10 RX ADMIN — Medication 975 MILLIGRAM(S): at 21:41

## 2023-04-10 RX ADMIN — Medication 975 MILLIGRAM(S): at 22:41

## 2023-04-10 RX ADMIN — CHLORHEXIDINE GLUCONATE 1 APPLICATION(S): 213 SOLUTION TOPICAL at 08:59

## 2023-04-10 RX ADMIN — Medication 1 APPLICATION(S): at 08:59

## 2023-04-10 NOTE — PHYSICAL THERAPY INITIAL EVALUATION ADULT - RANGE OF MOTION EXAMINATION, REHAB EVAL
bilateral upper extremity ROM was WFL (within functional limits)/bilateral lower extremity ROM was WFL (within functional limits) R knee ROM ~60 deg/bilateral upper extremity ROM was WFL (within functional limits)/bilateral lower extremity ROM was WFL (within functional limits)

## 2023-04-10 NOTE — PHYSICAL THERAPY INITIAL EVALUATION ADULT - GENERAL OBSERVATIONS, REHAB EVAL
Pt received semi supine in bed +hep lock +dresing on R knee CDI. PT received consent to treat from ISMAEL Colon. Pt amb with RW CGA no LOB noted. Pt was left as received with call bell in reach, VSS, and in NAD.

## 2023-04-10 NOTE — PHYSICAL THERAPY INITIAL EVALUATION ADULT - PERTINENT HX OF CURRENT PROBLEM, REHAB EVAL
3yo M with right knee pain x several years. He does reports recent injury to the LEFT LEG, with ORIF for the left tibia at [facility] in December. He was on Lovenox postoperatively which he has not taken since February. For the right knee, he has tried injections, PT, medications, rest and time without relief. He denies numbness or tingling. He does currently use a cane.    Denies PMHx of DM2, DVT/PE, MI or stroke.     Presents today for elective right total knee arthroplasty

## 2023-04-10 NOTE — PATIENT PROFILE ADULT - FALL HARM RISK - RISK INTERVENTIONS

## 2023-04-10 NOTE — PROGRESS NOTE ADULT - SUBJECTIVE AND OBJECTIVE BOX
Ortho Note    Pt comfortable without complaints, pain controlled  Denies CP, SOB, N/V, new numbness/tingling     Vital Signs Last 24 Hrs  T(C): 36.6 (04-10-23 @ 15:10), Max: 36.6 (04-10-23 @ 15:10)  T(F): 97.8 (04-10-23 @ 15:10), Max: 97.8 (04-10-23 @ 15:10)  HR: 70 (04-10-23 @ 17:43) (50 - 70)  BP: 127/72 (04-10-23 @ 17:43) (112/59 - 127/72)  BP(mean): 84 (04-10-23 @ 16:10) (78 - 87)  RR: 18 (04-10-23 @ 17:43) (12 - 18)  SpO2: 99% (04-10-23 @ 17:43) (96% - 99%)  I&O's Summary      General: Pt Alert and oriented, NAD  DSG C/D/I- ace wrap, cryocuff, stockinette overlying dressing, aquacel  B/L LE: sensation intact, DP 2+, brisk cap refill,   wiggling toes bilaterally, hip flexion      A/P: 62yMale s/p R TKA w/ Dr. Sosa 4/10  - Stable  - Pain Control  - DVT ppx: scds, asa 81 bid  - PT, WBS: rle wbat     Ortho Pager [phone]

## 2023-04-11 ENCOUNTER — TRANSCRIPTION ENCOUNTER (OUTPATIENT)
Age: 63
End: 2023-04-11

## 2023-04-11 LAB
ANION GAP SERPL CALC-SCNC: 7 MMOL/L — SIGNIFICANT CHANGE UP (ref 5–17)
BUN SERPL-MCNC: 19 MG/DL — SIGNIFICANT CHANGE UP (ref 7–23)
CALCIUM SERPL-MCNC: 8.9 MG/DL — SIGNIFICANT CHANGE UP (ref 8.4–10.5)
CHLORIDE SERPL-SCNC: 104 MMOL/L — SIGNIFICANT CHANGE UP (ref 96–108)
CO2 SERPL-SCNC: 27 MMOL/L — SIGNIFICANT CHANGE UP (ref 22–31)
CREAT SERPL-MCNC: 0.85 MG/DL — SIGNIFICANT CHANGE UP (ref 0.5–1.3)
EGFR: 99 ML/MIN/1.73M2 — SIGNIFICANT CHANGE UP
GLUCOSE SERPL-MCNC: 99 MG/DL — SIGNIFICANT CHANGE UP (ref 70–99)
HCT VFR BLD CALC: 37.4 % — LOW (ref 39–50)
HCV AB S/CO SERPL IA: 0.09 S/CO — SIGNIFICANT CHANGE UP (ref 0–0.99)
HCV AB SERPL-IMP: SIGNIFICANT CHANGE UP
HGB BLD-MCNC: 12.3 G/DL — LOW (ref 13–17)
MCHC RBC-ENTMCNC: 29.7 PG — SIGNIFICANT CHANGE UP (ref 27–34)
MCHC RBC-ENTMCNC: 32.9 GM/DL — SIGNIFICANT CHANGE UP (ref 32–36)
MCV RBC AUTO: 90.3 FL — SIGNIFICANT CHANGE UP (ref 80–100)
NRBC # BLD: 0 /100 WBCS — SIGNIFICANT CHANGE UP (ref 0–0)
PLATELET # BLD AUTO: 214 K/UL — SIGNIFICANT CHANGE UP (ref 150–400)
POTASSIUM SERPL-MCNC: 4.8 MMOL/L — SIGNIFICANT CHANGE UP (ref 3.5–5.3)
POTASSIUM SERPL-SCNC: 4.8 MMOL/L — SIGNIFICANT CHANGE UP (ref 3.5–5.3)
RBC # BLD: 4.14 M/UL — LOW (ref 4.2–5.8)
RBC # FLD: 12.9 % — SIGNIFICANT CHANGE UP (ref 10.3–14.5)
SODIUM SERPL-SCNC: 138 MMOL/L — SIGNIFICANT CHANGE UP (ref 135–145)
WBC # BLD: 12.39 K/UL — HIGH (ref 3.8–10.5)
WBC # FLD AUTO: 12.39 K/UL — HIGH (ref 3.8–10.5)

## 2023-04-11 PROCEDURE — 99222 1ST HOSP IP/OBS MODERATE 55: CPT

## 2023-04-11 RX ORDER — LANOLIN ALCOHOL/MO/W.PET/CERES
5 CREAM (GRAM) TOPICAL AT BEDTIME
Refills: 0 | Status: DISCONTINUED | OUTPATIENT
Start: 2023-04-11 | End: 2023-04-14

## 2023-04-11 RX ADMIN — SENNA PLUS 2 TABLET(S): 8.6 TABLET ORAL at 21:44

## 2023-04-11 RX ADMIN — POLYETHYLENE GLYCOL 3350 17 GRAM(S): 17 POWDER, FOR SOLUTION ORAL at 21:44

## 2023-04-11 RX ADMIN — Medication 15 MILLIGRAM(S): at 00:30

## 2023-04-11 RX ADMIN — Medication 81 MILLIGRAM(S): at 15:29

## 2023-04-11 RX ADMIN — Medication 975 MILLIGRAM(S): at 15:26

## 2023-04-11 RX ADMIN — Medication 1 TABLET(S): at 14:44

## 2023-04-11 RX ADMIN — PANTOPRAZOLE SODIUM 40 MILLIGRAM(S): 20 TABLET, DELAYED RELEASE ORAL at 07:02

## 2023-04-11 RX ADMIN — OXYCODONE HYDROCHLORIDE 10 MILLIGRAM(S): 5 TABLET ORAL at 00:30

## 2023-04-11 RX ADMIN — Medication 975 MILLIGRAM(S): at 07:01

## 2023-04-11 RX ADMIN — Medication 975 MILLIGRAM(S): at 08:01

## 2023-04-11 RX ADMIN — Medication 100 MILLIGRAM(S): at 07:02

## 2023-04-11 RX ADMIN — Medication 975 MILLIGRAM(S): at 21:44

## 2023-04-11 RX ADMIN — CELECOXIB 200 MILLIGRAM(S): 200 CAPSULE ORAL at 08:02

## 2023-04-11 RX ADMIN — CELECOXIB 200 MILLIGRAM(S): 200 CAPSULE ORAL at 07:02

## 2023-04-11 RX ADMIN — CELECOXIB 200 MILLIGRAM(S): 200 CAPSULE ORAL at 18:26

## 2023-04-11 RX ADMIN — Medication 975 MILLIGRAM(S): at 14:45

## 2023-04-11 RX ADMIN — CELECOXIB 200 MILLIGRAM(S): 200 CAPSULE ORAL at 17:50

## 2023-04-11 RX ADMIN — Medication 975 MILLIGRAM(S): at 22:44

## 2023-04-11 NOTE — DISCHARGE NOTE PROVIDER - NSDCMRMEDTOKEN_GEN_ALL_CORE_FT
ibuprofen 600 mg oral tablet: 1 orally prn  Tylenol 500 mg oral tablet: 2 orally prn as needed for  moderate pain  VITAMIN D3 DAILY:    acetaminophen 325 mg oral tablet: 3 tab(s) orally every 8 hours  aspirin 81 mg oral tablet, chewable: 1 tab(s) orally 2 times a day  celecoxib 200 mg oral capsule: 1 cap(s) orally every 12 hours  Multiple Vitamins oral tablet: 1 tab(s) orally once a day  oxyCODONE 5 mg oral tablet: 1 tab(s) orally every 4 hours As needed Moderate Pain (4 - 6)  pantoprazole 40 mg oral delayed release tablet: 1 tab(s) orally once a day (before a meal)  senna leaf extract oral tablet: 2 tab(s) orally once a day (at bedtime)  VITAMIN D3 DAILY:

## 2023-04-11 NOTE — PROGRESS NOTE ADULT - SUBJECTIVE AND OBJECTIVE BOX
Ortho Note    Pt comfortable without complaints, pain controlled.  Denies CP, SOB, N/V, new numbness/tingling.  Tolerating PO intake, voiding without complication, denies abdominal pain     Vital Signs Last 24 Hrs  T(C): 36.8 (04-11-23 @ 09:09), Max: 36.8 (04-11-23 @ 09:09)  T(F): 98.2 (04-11-23 @ 09:09), Max: 98.2 (04-11-23 @ 09:09)  HR: 59 (04-11-23 @ 09:09) (59 - 59)  BP: 110/68 (04-11-23 @ 09:09) (110/68 - 113/67)  BP(mean): --  RR: 17 (04-11-23 @ 09:09) (16 - 17)  SpO2: 95% (04-11-23 @ 09:09) (95% - 96%)  I&O's Summary    10 Apr 2023 07:01  -  11 Apr 2023 07:00  --------------------------------------------------------  IN: 840 mL / OUT: 150 mL / NET: 690 mL        Pt Alert and oriented, NAD  DSG C/D/I- gauze/teg at pin sites; Aquacel; Francisco stockings in place; crycocuff on  Pulses: DP2+ bilat LE  Sensation: General sensation to light touch intact bilat LE  Motor:   EHL/FHL/TA/GS 5/5 bilat LE                          12.3   12.39 )-----------( 214      ( 11 Apr 2023 05:30 )             37.4     04-11    138  |  104  |  19  ----------------------------<  99  4.8   |  27  |  0.85    Ca    8.9      11 Apr 2023 05:30        A/P: 62yMale s/p right TKR, 4/10/23, Dr. Sosa  - Stable  - Pain Control  - DVT ppx: SCDs, ASA 81mg BID  - PT, WBS: WBAT  - Dispo: C[facility] vs home; patient recently underwent left tibia surgery and required CODY postoperatively    Ortho Pager [phone] Ortho Note    Pt comfortable without complaints, pain controlled.  Denies CP, SOB, N/V, new numbness/tingling.  Tolerating PO intake, voiding without complication, denies abdominal pain     Vital Signs Last 24 Hrs  T(C): 36.8 (04-11-23 @ 09:09), Max: 36.8 (04-11-23 @ 09:09)  T(F): 98.2 (04-11-23 @ 09:09), Max: 98.2 (04-11-23 @ 09:09)  HR: 59 (04-11-23 @ 09:09) (59 - 59)  BP: 110/68 (04-11-23 @ 09:09) (110/68 - 113/67)  BP(mean): --  RR: 17 (04-11-23 @ 09:09) (16 - 17)  SpO2: 95% (04-11-23 @ 09:09) (95% - 96%)  I&O's Summary    10 Apr 2023 07:01  -  11 Apr 2023 07:00  --------------------------------------------------------  IN: 840 mL / OUT: 150 mL / NET: 690 mL        Pt Alert and oriented, NAD  DSG C/D/I- gauze/teg at pin sites; Aquacel; Francisco stockings in place; crycocuff on  Pulses: DP2+ bilat LE  Sensation: General sensation to light touch intact bilat LE  Motor:   EHL/FHL/TA/GS 5/5 bilat LE                          12.3   12.39 )-----------( 214      ( 11 Apr 2023 05:30 )             37.4     04-11    138  |  104  |  19  ----------------------------<  99  4.8   |  27  |  0.85    Ca    8.9      11 Apr 2023 05:30        A/P: 62yMale s/p right TKR, 4/10/23, Dr. Sosa  - Stable  - Pain Control  - DVT ppx: SCDs, ASA 81mg BID  - PT, WBS: WBAT  - Dispo: [facility] vs home; patient recently underwent left tibia surgery and required CODY ([facility]) postoperatively    Ortho Pager [phone] Ortho Note    Pt comfortable without complaints, pain controlled.  Denies CP, SOB, N/V, new numbness/tingling.  Tolerating PO intake, voiding without complication, denies abdominal pain     Vital Signs Last 24 Hrs  T(C): 36.8 (04-11-23 @ 09:09), Max: 36.8 (04-11-23 @ 09:09)  T(F): 98.2 (04-11-23 @ 09:09), Max: 98.2 (04-11-23 @ 09:09)  HR: 59 (04-11-23 @ 09:09) (59 - 59)  BP: 110/68 (04-11-23 @ 09:09) (110/68 - 113/67)  BP(mean): --  RR: 17 (04-11-23 @ 09:09) (16 - 17)  SpO2: 95% (04-11-23 @ 09:09) (95% - 96%)  I&O's Summary    10 Apr 2023 07:01  -  11 Apr 2023 07:00  --------------------------------------------------------  IN: 840 mL / OUT: 150 mL / NET: 690 mL        Pt Alert and oriented, NAD  DSG C/D/I- gauze/teg at pin sites; Aquacel; Francisco stockings in place; crycocuff on  Pulses: DP2+ bilat LE  Sensation: General sensation to light touch intact bilat LE  Motor:   EHL/FHL/TA/GS 5/5 bilat LE                          12.3   12.39 )-----------( 214      ( 11 Apr 2023 05:30 )             37.4     04-11    138  |  104  |  19  ----------------------------<  99  4.8   |  27  |  0.85    Ca    8.9      11 Apr 2023 05:30        A/P: 62yMale s/p right TKR, 4/10/23, Dr. Sosa  - Stable  - Pain Control  - DVT ppx: SCDs, ASA 81mg BID  - PT, WBS: WBAT  - Dispo: [facility]; patient recently underwent left tibia surgery and required C[facility] ([facility]) postoperatively    Ortho Pager [phone] Ortho Note    Pt comfortable without complaints, pain controlled.  Denies CP, SOB, N/V, new numbness/tingling.  Tolerating PO intake, voiding without complication, denies abdominal pain     Vital Signs Last 24 Hrs  T(C): 36.8 (04-11-23 @ 09:09), Max: 36.8 (04-11-23 @ 09:09)  T(F): 98.2 (04-11-23 @ 09:09), Max: 98.2 (04-11-23 @ 09:09)  HR: 59 (04-11-23 @ 09:09) (59 - 59)  BP: 110/68 (04-11-23 @ 09:09) (110/68 - 113/67)  BP(mean): --  RR: 17 (04-11-23 @ 09:09) (16 - 17)  SpO2: 95% (04-11-23 @ 09:09) (95% - 96%)  I&O's Summary    10 Apr 2023 07:01  -  11 Apr 2023 07:00  --------------------------------------------------------  IN: 840 mL / OUT: 150 mL / NET: 690 mL        Pt Alert and oriented, NAD  DSG C/D/I- gauze/teg at pin sites; Aquacel; Francisco stockings in place; crycocuff on  Pulses: DP2+ bilat LE  Sensation: General sensation to light touch intact bilat LE  Motor:   EHL/FHL/TA/GS 5/5 bilat LE                          12.3   12.39 )-----------( 214      ( 11 Apr 2023 05:30 )             37.4     04-11    138  |  104  |  19  ----------------------------<  99  4.8   |  27  |  0.85    Ca    8.9      11 Apr 2023 05:30        A/P: 62yMale s/p right TKR, 4/10/23, Dr. Sosa  - Stable  - Pain Control  - DVT ppx: SCDs, ASA 81mg BID  - PT, WBS: WBAT  - Dispo: C[facility]; patient recently underwent left tibia surgery and required [facility] ([facility]) postoperatively. C[facility] auth started today    Ortho Pager [phone]

## 2023-04-11 NOTE — DISCHARGE NOTE PROVIDER - NSDCFUADDINST_GEN_ALL_CORE_FT
Please follow Dr. Sosa’s instruction sheets (IF APPLICABLE)   ACTIVITY:   - Weight bear as tolerated with assistive device. No strenuous activity, heavy lifting, driving or returning to work until cleared by MD.   - Apply a cold compress to the surgical site several times daily to reduce pain and swelling. For icing, twenty-minute sessions followed by an hour off is recommended. You should ice as frequently as possible. Ice should NEVER be placed directly on the skin. Wearing compression stockings during the first week after surgery can help reduce swelling in your knee, calf and foot, but is not required.      (KNEE REPLACEMENTS)   DO place a pillow under your heel when elevating the leg, to encourage full extension of the knee. Do NOT place a pillow behind your knee for comfort, as this can lead to permanent difficulty straightening your leg. It is normal to develop some swelling in the leg, ankle, and foot because of gravity.       DRESSING/SHOWERING:   (AQUACEL – brown gel dressing)   - You may shower, your dressing is water-resistant. Do not soak in bathtubs. Remove dressing after postop day 7, then leave incision open to air. You may do this yourself (simply peel it off), or you may ask for assistance from your visiting nurse. Keep your incision clean and dry. Do not pick at your incision. Do not apply creams, ointments or oils to your incision until cleared by your surgeon. Do not soak your incision in sitting water (ie tubs, pools, lakes, etc.) until cleared by your surgeon. Do not scrub the incision – instead, allow soap and water to flow over the incision and then pat it dry with a clean towel.       FOLLOW-UP:   - Call to schedule an appt with Dr. Sosa's for follow up.   - Please follow-up with your primary care physician or any other specialist you see postoperatively, if needed.    - Contact your doctor or go to the emergency room if you experience: fever greater than 101.5, chills, chest pain, difficulty breathing, redness or excessive drainage around the incision, other concerns.    Your medications have been sent to [facility].  Please follow Dr. Sosa’s instruction sheets  _____________  ACTIVITY:   - Weight bear as tolerated with assistive device. No strenuous activity, heavy lifting, driving or returning to work until cleared by MD.   - Apply a cold compress to the surgical site several times daily to reduce pain and swelling. For icing, twenty-minute sessions followed by an hour off is recommended. You should ice as frequently as possible. Ice should NEVER be placed directly on the skin. Wearing compression stockings during the first week after surgery can help reduce swelling in your knee, calf and foot, but is not required.   - DO place a pillow under your heel when elevating the leg, to encourage full extension of the knee. Do NOT place a pillow behind your knee for comfort, as this can lead to permanent difficulty straightening your leg. It is normal to develop some swelling in the leg, ankle, and foot because of gravity.   _____________  DRESSING/SHOWERING: (AQUACEL – brown gel dressing)   - Please follow Dr. Sosa's dressing instructions.  - You may shower, your dressing is water-resistant. Do not soak in bathtubs. Keep your incision clean and dry. Do not pick at your incision. Do not apply creams, ointments or oils to your incision until cleared by your surgeon. Do not soak your incision in sitting water (ie tubs, pools, lakes, etc.) until cleared by your surgeon. Do not scrub the incision – instead, allow soap and water to flow over the incision and then pat it dry with a clean towel.   _____________  FOLLOW-UP:   - You have a postop appt on 4/25 with Dr. Sosa. Please call his office for verification of time if needed.   - Please follow-up with your primary care physician or any other specialist you see postoperatively, if needed.    - Contact your doctor or go to the emergency room if you experience: fever greater than 101.5, chills, chest pain, difficulty breathing, redness or excessive drainage around the incision, other concerns.  - Your medications have been sent to [facility].  Please follow Dr. Sosa’s instruction sheets  _____________  ACTIVITY:   - Weight bear as tolerated with assistive device. No strenuous activity, heavy lifting, driving or returning to work until cleared by MD.   - Apply a cold compress to the surgical site several times daily to reduce pain and swelling. For icing, twenty-minute sessions followed by an hour off is recommended. You should ice as frequently as possible. Ice should NEVER be placed directly on the skin. Wearing compression stockings during the first week after surgery can help reduce swelling in your knee, calf and foot, but is not required.   - DO place a pillow under your heel when elevating the leg, to encourage full extension of the knee. Do NOT place a pillow behind your knee for comfort, as this can lead to permanent difficulty straightening your leg. It is normal to develop some swelling in the leg, ankle, and foot because of gravity.   _____________  DRESSING/SHOWERING: (AQUACEL – brown gel dressing)   - Please follow Dr. Sosa's dressing instructions.  - You may shower, your dressing is water-resistant. Do not soak in bathtubs. Keep your incision clean and dry. Do not pick at your incision. Do not apply creams, ointments or oils to your incision until cleared by your surgeon. Do not soak your incision in sitting water (ie tubs, pools, lakes, etc.) until cleared by your surgeon. Do not scrub the incision – instead, allow soap and water to flow over the incision and then pat it dry with a clean towel.   _____________  FOLLOW-UP:   - You have a postop appt on 4/25 with Dr. Sosa. Please call his office for verification of time if needed.   - Please follow-up with your primary care physician or any other specialist you see postoperatively, if needed.    - Contact your doctor or go to the emergency room if you experience: fever greater than 101.5, chills, chest pain, difficulty breathing, redness or excessive drainage around the incision, other concerns.

## 2023-04-11 NOTE — CONSULT NOTE ADULT - SUBJECTIVE AND OBJECTIVE BOX
Patient is a 62y old  Male who presents with a chief complaint of Right knee pain (11 Apr 2023 10:38)        HPI : 62 year old male admitted to the hospital with Right Knee pain , he is pre diabetic , does not take any medication at home , he underwent right TKA on 4/10/23 and pain is now well controlled , He denies post operative cough , shortness of breath , dizziness , nausea or vomiting.     Overnight Events : No events overnight       REVIEW OF SYSTEMS: 12 point review of systems negative except those stated else where in the note       PAST MEDICAL & SURGICAL HISTORY:  Osteoarthritis      Prediabetes      H/O knee surgery  LEFT KNEE FRACTURE          Social History:  No tobacco  Lives with 3 roommates, none related  3 flights of stairs (07 Apr 2023 09:39)      FAMILY HISTORY: No Family hx of Early CAD, Stroke , Cancer       Home Medications:  ibuprofen 600 mg oral tablet: 1 orally prn (07 Apr 2023 12:09)  Tylenol 500 mg oral tablet: 2 orally prn as needed for  moderate pain (07 Apr 2023 12:09)  VITAMIN D3 DAILY:  (07 Apr 2023 12:22)      MEDICATIONS  (STANDING):  acetaminophen     Tablet .. 975 milliGRAM(s) Oral every 8 hours  aspirin  chewable 81 milliGRAM(s) Oral two times a day  celecoxib 200 milliGRAM(s) Oral every 12 hours  ketorolac   Injectable 15 milliGRAM(s) IV Push every 6 hours  lactated ringers. 1000 milliLiter(s) (120 mL/Hr) IV Continuous <Continuous>  multivitamin 1 Tablet(s) Oral daily  pantoprazole    Tablet 40 milliGRAM(s) Oral before breakfast  polyethylene glycol 3350 17 Gram(s) Oral at bedtime  senna 2 Tablet(s) Oral at bedtime    MEDICATIONS  (PRN):  HYDROmorphone  Injectable 0.5 milliGRAM(s) IV Push every 4 hours PRN breakthrough pain floors only  magnesium hydroxide Suspension 30 milliLiter(s) Oral daily PRN Constipation  ondansetron Injectable 4 milliGRAM(s) IV Push every 6 hours PRN Nausea and/or Vomiting  oxyCODONE    IR 5 milliGRAM(s) Oral every 4 hours PRN Moderate Pain (4 - 6)  oxyCODONE    IR 10 milliGRAM(s) Oral every 4 hours PRN Severe Pain (7 - 10)  traMADol 50 milliGRAM(s) Oral every 6 hours PRN Mild Pain (1 - 3)      Vital Signs Last 24 Hrs  T(C): 36.8 (11 Apr 2023 09:09), Max: 36.8 (11 Apr 2023 09:09)  T(F): 98.2 (11 Apr 2023 09:09), Max: 98.2 (11 Apr 2023 09:09)  HR: 59 (11 Apr 2023 09:09) (50 - 71)  BP: 110/68 (11 Apr 2023 09:09) (110/68 - 137/76)  BP(mean): 84 (10 Apr 2023 16:10) (78 - 87)  RR: 17 (11 Apr 2023 09:09) (12 - 18)  SpO2: 95% (11 Apr 2023 09:09) (95% - 99%)      04-10-23 @ 07:01  -  04-11-23 @ 07:00  --------------------------------------------------------  IN: 840 mL / OUT: 150 mL / NET: 690 mL        LABS:                        12.3   12.39 )-----------( 214      ( 11 Apr 2023 05:30 )             37.4     04-11    138  |  104  |  19  ----------------------------<  99  4.8   |  27  |  0.85    Ca    8.9      11 Apr 2023 05:30          CAPILLARY BLOOD GLUCOSE                    RADIOLOGY & ADDITIONAL TESTS:    Imaging personally reviewed and radiologists report reviewed     Consultant(s) Notes Reviewed    PHYSICAL EXAM:  GENERAL: not in distress   HEAD, EYES: EOMI, PERRLA, conjunctiva and sclera clear  ENMT:  Moist mucous membranes,   NECK: Supple, No JVD,   NERVOUS SYSTEM:  Alert & Oriented X3, Good concentration; Motor Strength 5/5 B/L upper and lower extremities;   CHEST/LUNG: Clear to auscultation  bilaterally; No rales, rhonchi, wheezing,   HEART: Regular rate and rhythm; No murmurs, rubs, or gallops  ABDOMEN: Soft, Nontender, Nondistended; Bowel sounds present  EXTREMITIES:  2+ Peripheral Pulses, No clubbing, cyanosis, or edema  SKIN: No rashes or lesions    Care Discussed with Primary team

## 2023-04-11 NOTE — DISCHARGE NOTE PROVIDER - NSDCCPCAREPLAN_GEN_ALL_CORE_FT
PRINCIPAL DISCHARGE DIAGNOSIS  Diagnosis: Osteoarthritis  Assessment and Plan of Treatment: R Knee

## 2023-04-11 NOTE — PROGRESS NOTE ADULT - SUBJECTIVE AND OBJECTIVE BOX
Procedure:  R TKA   Surgeon: Dr. ISAAK Sosa    endorses R thigh pain controlled on pain meds   Denies CP, SOB, N/V, numbness/tingling     Vital Signs Last 24 Hrs  T(C): 36.4 (04-11-23 @ 05:46), Max: 36.4 (04-11-23 @ 05:46)  T(F): 97.6 (04-11-23 @ 05:46), Max: 97.6 (04-11-23 @ 05:46)  HR: 59 (04-11-23 @ 05:46) (59 - 59)  BP: 113/67 (04-11-23 @ 05:46) (113/67 - 113/67)  BP(mean): --  RR: 16 (04-11-23 @ 05:46) (16 - 16)  SpO2: 96% (04-11-23 @ 05:46) (96% - 96%)    General: Pt Alert and oriented, NAD  DSG C/D/I, R knee in cryocuff   Pulses: 2+ DP  Sensation: SILT grossly SPN/DPN/Saph/Neeta/Tib  Motor: 5/5 TA/GS/EHL, Quad/Psoas firing limited 2/2 pain    Post-op X-Ray: hardware intact w/o fracture    A/P: 62yMale s/p R TKA  by Dr. ISAAK Sosa on 04-11  - Stable  - Pain Control  - DVT ppx: ASA   - Post op abx: Ancef  - WBS: WBAT  - PT eval   - dispo: please try to apply for CODY, pt lives on 2nd floor and has no help     Ortho Pager [phone]

## 2023-04-11 NOTE — DISCHARGE NOTE PROVIDER - CARE PROVIDER_API CALL
Nickolas Ssoa)  Orthopaedic Surgery  [address] [facility], [place] [id number]  Phone: [phone]  Fax: [phone]  Follow Up Time:

## 2023-04-11 NOTE — DISCHARGE NOTE PROVIDER - HOSPITAL COURSE
Admitted: 4-  Surgery: s/p R TKA  Eliana-op Antibiotics:  Pain control  DVT prophylaxis:  OOB/Physical Therapy  Consultants:  Inpatient events:   Admitted: 4-  Surgery: s/p R TKA  Eliana-op Antibiotics: Ancef  Pain control  DVT prophylaxis: SCDs, ASA 81mg BID  OOB/Physical Therapy  Consultants: Medicine  Inpatient events:   Admitted: 4-  Surgery: s/p Right Total Knee Replacement   Eliana-op Antibiotics: Ancef  Pain control  DVT prophylaxis: SCDs, ASA 81mg twice daily post operatively   OOB/Physical Therapy  Consultants: Hospitalist

## 2023-04-11 NOTE — CONSULT NOTE ADULT - ASSESSMENT
62 year old male with prediabetes admitted for right knee pain     Impression and Plan   # Right Knee OA s/p Right TKA on 4/10/23   - pain control , DVT prophylaxis , Weightbearing status , Dressing changes and drain care per ortho team    # Pre Diabetes , No new medication    # DVT prophylaxis

## 2023-04-11 NOTE — DISCHARGE NOTE PROVIDER - NSDCFUSCHEDAPPT_GEN_ALL_CORE_FT
Nickolas Sosa  [facility] Physician [facility]  ORTHOSURG [address]  Scheduled Appointment: 04/25/2023

## 2023-04-11 NOTE — DISCHARGE NOTE PROVIDER - NSDCCPTREATMENT_GEN_ALL_CORE_FT
PRINCIPAL PROCEDURE  Procedure: Knee replacement, total, right  Findings and Treatment:      PRINCIPAL PROCEDURE  Procedure: Knee replacement, total, right  Findings and Treatment: right knee osteoarthritis

## 2023-04-12 LAB
ANION GAP SERPL CALC-SCNC: 7 MMOL/L — SIGNIFICANT CHANGE UP (ref 5–17)
BUN SERPL-MCNC: 15 MG/DL — SIGNIFICANT CHANGE UP (ref 7–23)
CALCIUM SERPL-MCNC: 9 MG/DL — SIGNIFICANT CHANGE UP (ref 8.4–10.5)
CHLORIDE SERPL-SCNC: 105 MMOL/L — SIGNIFICANT CHANGE UP (ref 96–108)
CO2 SERPL-SCNC: 29 MMOL/L — SIGNIFICANT CHANGE UP (ref 22–31)
CREAT SERPL-MCNC: 0.8 MG/DL — SIGNIFICANT CHANGE UP (ref 0.5–1.3)
EGFR: 100 ML/MIN/1.73M2 — SIGNIFICANT CHANGE UP
GLUCOSE SERPL-MCNC: 113 MG/DL — HIGH (ref 70–99)
HCT VFR BLD CALC: 37.7 % — LOW (ref 39–50)
HGB BLD-MCNC: 12.2 G/DL — LOW (ref 13–17)
MCHC RBC-ENTMCNC: 29.8 PG — SIGNIFICANT CHANGE UP (ref 27–34)
MCHC RBC-ENTMCNC: 32.4 GM/DL — SIGNIFICANT CHANGE UP (ref 32–36)
MCV RBC AUTO: 92 FL — SIGNIFICANT CHANGE UP (ref 80–100)
NRBC # BLD: 0 /100 WBCS — SIGNIFICANT CHANGE UP (ref 0–0)
PLATELET # BLD AUTO: 207 K/UL — SIGNIFICANT CHANGE UP (ref 150–400)
POTASSIUM SERPL-MCNC: 4.5 MMOL/L — SIGNIFICANT CHANGE UP (ref 3.5–5.3)
POTASSIUM SERPL-SCNC: 4.5 MMOL/L — SIGNIFICANT CHANGE UP (ref 3.5–5.3)
RBC # BLD: 4.1 M/UL — LOW (ref 4.2–5.8)
RBC # FLD: 13.4 % — SIGNIFICANT CHANGE UP (ref 10.3–14.5)
SODIUM SERPL-SCNC: 141 MMOL/L — SIGNIFICANT CHANGE UP (ref 135–145)
WBC # BLD: 11.24 K/UL — HIGH (ref 3.8–10.5)
WBC # FLD AUTO: 11.24 K/UL — HIGH (ref 3.8–10.5)

## 2023-04-12 PROCEDURE — 99231 SBSQ HOSP IP/OBS SF/LOW 25: CPT

## 2023-04-12 RX ADMIN — Medication 1 TABLET(S): at 14:08

## 2023-04-12 RX ADMIN — Medication 975 MILLIGRAM(S): at 15:29

## 2023-04-12 RX ADMIN — SENNA PLUS 2 TABLET(S): 8.6 TABLET ORAL at 21:38

## 2023-04-12 RX ADMIN — PANTOPRAZOLE SODIUM 40 MILLIGRAM(S): 20 TABLET, DELAYED RELEASE ORAL at 06:14

## 2023-04-12 RX ADMIN — Medication 975 MILLIGRAM(S): at 07:22

## 2023-04-12 RX ADMIN — Medication 975 MILLIGRAM(S): at 14:08

## 2023-04-12 RX ADMIN — Medication 5 MILLIGRAM(S): at 21:39

## 2023-04-12 RX ADMIN — MAGNESIUM HYDROXIDE 30 MILLILITER(S): 400 TABLET, CHEWABLE ORAL at 21:39

## 2023-04-12 RX ADMIN — Medication 975 MILLIGRAM(S): at 21:38

## 2023-04-12 RX ADMIN — Medication 975 MILLIGRAM(S): at 08:00

## 2023-04-12 RX ADMIN — CELECOXIB 200 MILLIGRAM(S): 200 CAPSULE ORAL at 17:47

## 2023-04-12 RX ADMIN — CELECOXIB 200 MILLIGRAM(S): 200 CAPSULE ORAL at 08:00

## 2023-04-12 RX ADMIN — Medication 81 MILLIGRAM(S): at 07:22

## 2023-04-12 RX ADMIN — POLYETHYLENE GLYCOL 3350 17 GRAM(S): 17 POWDER, FOR SOLUTION ORAL at 21:38

## 2023-04-12 RX ADMIN — CELECOXIB 200 MILLIGRAM(S): 200 CAPSULE ORAL at 07:22

## 2023-04-12 RX ADMIN — Medication 81 MILLIGRAM(S): at 17:47

## 2023-04-12 NOTE — PROGRESS NOTE ADULT - SUBJECTIVE AND OBJECTIVE BOX
Ortho Note    Pt comfortable without complaints, pain controlled.  Denies CP, SOB, N/V, new numbness/tingling.  Tolerating PO intake, voiding without complication. No bowel movement yet, denies abdominal pain.     Vital Signs Last 24 Hrs  T(C): 37.2 (04-12-23 @ 08:42), Max: 37.2 (04-12-23 @ 08:42)  T(F): 98.9 (04-12-23 @ 08:42), Max: 98.9 (04-12-23 @ 08:42)  HR: 67 (04-12-23 @ 08:42) (61 - 67)  BP: 106/58 (04-12-23 @ 08:42) (102/65 - 106/58)  BP(mean): --  RR: 17 (04-12-23 @ 08:42) (17 - 18)  SpO2: 97% (04-12-23 @ 08:42) (97% - 99%)  I&O's Summary    11 Apr 2023 07:01  -  12 Apr 2023 07:00  --------------------------------------------------------  IN: 840 mL / OUT: 1750 mL / NET: -910 mL        Pt Alert and oriented, NAD  DSG C/D/I- gauze/teg; aquacel; NAIF stockings in place  Pulses: DP2+ bilat LE  Sensation: General sensation to light touch intact  Motor: EHL/FHL/TA/GS 5/5 bilat LE  Abdomen soft, non TTP                          12.2   11.24 )-----------( 207      ( 12 Apr 2023 05:30 )             37.7     04-12    141  |  105  |  15  ----------------------------<  113<H>  4.5   |  29  |  0.80    Ca    9.0      12 Apr 2023 05:30        A/P: 63yMale s/p right TKR, 4/10/23, Dr. Sosa  - Stable  - Pain Control  - DVT ppx: SCDs, ASA 81mg BID  - PT, WBS: WBAT  - Appreciate Med recs  - Dispo: C[facility], pending auth- [place]    Ortho Pager [phone] Ortho Note    Pt comfortable without complaints, pain controlled.  Denies CP, SOB, N/V, new numbness/tingling.  Tolerating PO intake, voiding without complication. No bowel movement yet, denies abdominal pain.     Vital Signs Last 24 Hrs  T(C): 37.2 (04-12-23 @ 08:42), Max: 37.2 (04-12-23 @ 08:42)  T(F): 98.9 (04-12-23 @ 08:42), Max: 98.9 (04-12-23 @ 08:42)  HR: 67 (04-12-23 @ 08:42) (61 - 67)  BP: 106/58 (04-12-23 @ 08:42) (102/65 - 106/58)  BP(mean): --  RR: 17 (04-12-23 @ 08:42) (17 - 18)  SpO2: 97% (04-12-23 @ 08:42) (97% - 99%)  I&O's Summary    11 Apr 2023 07:01  -  12 Apr 2023 07:00  --------------------------------------------------------  IN: 840 mL / OUT: 1750 mL / NET: -910 mL        Pt Alert and oriented, NAD  DSG C/D/I- gauze/teg; aquacel; NAIF stockings in place  Pulses: DP2+ bilat LE  Sensation: General sensation to light touch intact  Motor: EHL/FHL/TA/GS 5/5 bilat LE  Abdomen soft, non TTP                          12.2   11.24 )-----------( 207      ( 12 Apr 2023 05:30 )             37.7     04-12    141  |  105  |  15  ----------------------------<  113<H>  4.5   |  29  |  0.80    Ca    9.0      12 Apr 2023 05:30        A/P: 63yMale s/p right TKR, 4/10/23, Dr. Sosa  - Stable  - Pain Control  - DVT ppx: SCDs, ASA 81mg BID  - PT, WBS: WBAT  - Appreciate Med recs  - Dispo: CODY. Received auth at [facility], waiting for bed    Ortho Pager [phone]

## 2023-04-12 NOTE — PROGRESS NOTE ADULT - SUBJECTIVE AND OBJECTIVE BOX
Procedure:  R TKA   Surgeon: Dr. ISAAK Sosa    endorses R thigh pain controlled on pain meds. amb up and down hallway w/ PT yday   Denies CP, SOB, N/V, numbness/tingling     Vital Signs Last 24 Hrs  T(C): 36.6 (11 Apr 2023 20:21), Max: 36.8 (11 Apr 2023 09:09)  T(F): 97.8 (11 Apr 2023 20:21), Max: 98.2 (11 Apr 2023 09:09)  HR: 58 (11 Apr 2023 20:21) (54 - 59)  BP: 98/61 (11 Apr 2023 20:21) (98/61 - 127/67)  BP(mean): --  RR: 18 (11 Apr 2023 20:21) (17 - 18)  SpO2: 98% (11 Apr 2023 20:21) (95% - 98%)    Parameters below as of 11 Apr 2023 15:50  Patient On (Oxygen Delivery Method): room air        General: Pt Alert and oriented, NAD  DSG C/D/I, R knee in cryocuff   Pulses: 2+ DP  Sensation: SILT grossly SPN/DPN/Saph/Neeta/Tib  Motor: 5/5 TA/GS/EHL, Quad/Psoas firing limited 2/2 pain      A/P: 62yMale s/p R TKA  by Dr. ISAAK Sosa on 04-11  - Stable  - Pain Control  - DVT ppx: ASA   - Post op abx: Ancef  - WBS: WBAT  - please continue PT sessions while patient is in-house  - dispo: CODY      Ortho Pager [phone]

## 2023-04-12 NOTE — PROGRESS NOTE ADULT - SUBJECTIVE AND OBJECTIVE BOX
Patient is a 63y old  Male who presents with a chief complaint of Right knee pain (12 Apr 2023 06:09)        SUBJECTIVE:  Patient was seen and examined at bedside.    Overnight Events : No new Complaints , pain controlled , participating in PT , Awaiting CODY       Review of systems: 12 point Review of systems negative unless otherwise documented elsewhere in note.     Diet, Regular (04-10-23 @ 11:19) [Active]      MEDICATIONS:  MEDICATIONS  (STANDING):  acetaminophen     Tablet .. 975 milliGRAM(s) Oral every 8 hours  aspirin  chewable 81 milliGRAM(s) Oral two times a day  celecoxib 200 milliGRAM(s) Oral every 12 hours  lactated ringers. 1000 milliLiter(s) (120 mL/Hr) IV Continuous <Continuous>  melatonin 5 milliGRAM(s) Oral at bedtime  multivitamin 1 Tablet(s) Oral daily  pantoprazole    Tablet 40 milliGRAM(s) Oral before breakfast  polyethylene glycol 3350 17 Gram(s) Oral at bedtime  senna 2 Tablet(s) Oral at bedtime    MEDICATIONS  (PRN):  HYDROmorphone  Injectable 0.5 milliGRAM(s) IV Push every 4 hours PRN breakthrough pain floors only  magnesium hydroxide Suspension 30 milliLiter(s) Oral daily PRN Constipation  ondansetron Injectable 4 milliGRAM(s) IV Push every 6 hours PRN Nausea and/or Vomiting  oxyCODONE    IR 5 milliGRAM(s) Oral every 4 hours PRN Moderate Pain (4 - 6)  oxyCODONE    IR 10 milliGRAM(s) Oral every 4 hours PRN Severe Pain (7 - 10)  traMADol 50 milliGRAM(s) Oral every 6 hours PRN Mild Pain (1 - 3)      Allergies    No Known Allergies    Intolerances        OBJECTIVE:  Vital Signs Last 24 Hrs  T(C): 37.2 (12 Apr 2023 08:42), Max: 37.2 (12 Apr 2023 08:42)  T(F): 98.9 (12 Apr 2023 08:42), Max: 98.9 (12 Apr 2023 08:42)  HR: 67 (12 Apr 2023 08:42) (54 - 67)  BP: 106/58 (12 Apr 2023 08:42) (98/61 - 127/67)  BP(mean): --  RR: 17 (12 Apr 2023 08:42) (17 - 18)  SpO2: 97% (12 Apr 2023 08:42) (97% - 99%)    Parameters below as of 12 Apr 2023 08:42  Patient On (Oxygen Delivery Method): room air      I&O's Summary    11 Apr 2023 07:01  -  12 Apr 2023 07:00  --------------------------------------------------------  IN: 840 mL / OUT: 1750 mL / NET: -910 mL        PHYSICAL EXAM:  Gen: Resting in bed at time of exam, not in distress   HEENT: moist mucosa, no lesions   Neck: supple, trachea at midline  CV: RRR, +S1/S2  Pulm: no wheezing , no crackles  no increase in work of breathing  Abd: soft, NTND  Skin: warm and dry, no new rashes   Ext: moving all 4 extremities spontaneously , no edema  ,  Neuro: AOx3, no gross focal neurological deficits  Psych: affect and behavior appropriate, pleasant at time of interview    LABS:                        12.2   11.24 )-----------( 207      ( 12 Apr 2023 05:30 )             37.7     04-12    141  |  105  |  15  ----------------------------<  113<H>  4.5   |  29  |  0.80    Ca    9.0      12 Apr 2023 05:30          CAPILLARY BLOOD GLUCOSE            MICRODATA:      RADIOLOGY/OTHER STUDIES:

## 2023-04-13 RX ORDER — DIPHENHYDRAMINE HCL 50 MG
50 CAPSULE ORAL ONCE
Refills: 0 | Status: COMPLETED | OUTPATIENT
Start: 2023-04-13 | End: 2023-04-13

## 2023-04-13 RX ADMIN — Medication 1 TABLET(S): at 13:15

## 2023-04-13 RX ADMIN — OXYCODONE HYDROCHLORIDE 10 MILLIGRAM(S): 5 TABLET ORAL at 23:33

## 2023-04-13 RX ADMIN — Medication 81 MILLIGRAM(S): at 05:41

## 2023-04-13 RX ADMIN — PANTOPRAZOLE SODIUM 40 MILLIGRAM(S): 20 TABLET, DELAYED RELEASE ORAL at 05:41

## 2023-04-13 RX ADMIN — Medication 975 MILLIGRAM(S): at 22:33

## 2023-04-13 RX ADMIN — Medication 975 MILLIGRAM(S): at 13:15

## 2023-04-13 RX ADMIN — Medication 975 MILLIGRAM(S): at 14:15

## 2023-04-13 RX ADMIN — TRAMADOL HYDROCHLORIDE 50 MILLIGRAM(S): 50 TABLET ORAL at 04:22

## 2023-04-13 RX ADMIN — Medication 81 MILLIGRAM(S): at 17:24

## 2023-04-13 RX ADMIN — Medication 50 MILLIGRAM(S): at 19:49

## 2023-04-13 RX ADMIN — CELECOXIB 200 MILLIGRAM(S): 200 CAPSULE ORAL at 18:35

## 2023-04-13 RX ADMIN — Medication 975 MILLIGRAM(S): at 23:33

## 2023-04-13 RX ADMIN — Medication 975 MILLIGRAM(S): at 05:42

## 2023-04-13 RX ADMIN — Medication 975 MILLIGRAM(S): at 06:42

## 2023-04-13 RX ADMIN — CELECOXIB 200 MILLIGRAM(S): 200 CAPSULE ORAL at 05:41

## 2023-04-13 RX ADMIN — TRAMADOL HYDROCHLORIDE 50 MILLIGRAM(S): 50 TABLET ORAL at 05:22

## 2023-04-13 RX ADMIN — CELECOXIB 200 MILLIGRAM(S): 200 CAPSULE ORAL at 06:41

## 2023-04-13 RX ADMIN — Medication 5 MILLIGRAM(S): at 22:33

## 2023-04-13 RX ADMIN — OXYCODONE HYDROCHLORIDE 10 MILLIGRAM(S): 5 TABLET ORAL at 22:33

## 2023-04-13 RX ADMIN — Medication 10 MILLIGRAM(S): at 06:44

## 2023-04-13 RX ADMIN — CELECOXIB 200 MILLIGRAM(S): 200 CAPSULE ORAL at 17:24

## 2023-04-13 NOTE — PROGRESS NOTE ADULT - SUBJECTIVE AND OBJECTIVE BOX
Procedure:  R TKA   Surgeon: Dr. ISAAK Sosa    endorses R thigh pain controlled on pain meds. amb up and down hallway w/ PT yday   Denies CP, SOB, N/V, numbness/tingling     Vital Signs Last 24 Hrs  T(C): 36.5 (13 Apr 2023 05:38), Max: 37.2 (12 Apr 2023 08:42)  T(F): 97.7 (13 Apr 2023 05:38), Max: 98.9 (12 Apr 2023 08:42)  HR: 62 (13 Apr 2023 05:38) (62 - 67)  BP: 116/66 (13 Apr 2023 05:38) (105/54 - 116/66)  BP(mean): --  RR: 18 (13 Apr 2023 05:38) (16 - 18)  SpO2: 99% (13 Apr 2023 05:38) (96% - 99%)    Parameters below as of 13 Apr 2023 05:38  Patient On (Oxygen Delivery Method): room air      General: Pt Alert and oriented, NAD  DSG C/D/I, R knee in cryocuff   Pulses: 2+ DP  Sensation: SILT grossly SPN/DPN/Saph/Neeta/Tib  Motor: 5/5 TA/GS/EHL, Quad/Psoas firing limited 2/2 pain      A/P: 62yMale s/p R TKA  by Dr. ISAAK Sosa on 04-11  - Stable  - Pain Control  - DVT ppx: ASA   - Post op abx: Ancef  - WBS: WBAT  - please continue PT sessions while patient is in-house  - dispo: CODY      Ortho Pager [phone]

## 2023-04-13 NOTE — PROGRESS NOTE ADULT - SUBJECTIVE AND OBJECTIVE BOX
Ortho Note    Subjective:  Pt comfortable without complaints, pain controlled with current pain medication regimen.   Denies CP, SOB, N/V, numbness/tingling   Reviewed plan of care with patient at bedside    Vital Signs Last 24 Hrs  T(C): 36.7 (04-13-23 @ 08:37), Max: 36.7 (04-13-23 @ 08:37)  T(F): 98 (04-13-23 @ 08:37), Max: 98 (04-13-23 @ 08:37)  HR: 64 (04-13-23 @ 08:37) (62 - 64)  BP: 102/64 (04-13-23 @ 08:37) (102/64 - 116/66)  BP(mean): --  RR: 14 (04-13-23 @ 08:37) (14 - 18)  SpO2: 98% (04-13-23 @ 08:37) (98% - 99%)  AVSS    Objective:    Physical Exam:  General: Pt Alert and oriented, NAD  Right knee aquacel DSG C/D/I  Pulses: +2 pedal pulses, wwp toes  Sensation: silt intact bilateral lower extremities  Motor: EHL/FHL/TA/GS- 5/5 bilateral lower extremities        Plan of Care:  A/P: 63yMale s/p Right TKA on 4-10  - afebrile  - Pain Control- celebrex 200mg PO Q12h, tylenol 975mg PO Q8h, Dilaudid 0.5mg Q4h IVP prn breakthrough pain, Oxycodone 5-10mg PO Q4h prn moderate to severe pain   - DVT ppx: aspirin 81mg PO BID  - PT, WBS: WBAT  - appreciate medicine recs  - bowel regimen, IS use, PPI  - ambulate with PT as tolerated  - Dispo- [facility] pending auth and acceptance, bed availability     Ortho Pager [phone]

## 2023-04-14 ENCOUNTER — TRANSCRIPTION ENCOUNTER (OUTPATIENT)
Age: 63
End: 2023-04-14

## 2023-04-14 VITALS
TEMPERATURE: 97 F | SYSTOLIC BLOOD PRESSURE: 111 MMHG | DIASTOLIC BLOOD PRESSURE: 56 MMHG | RESPIRATION RATE: 16 BRPM | OXYGEN SATURATION: 94 % | HEART RATE: 68 BPM

## 2023-04-14 PROCEDURE — 99231 SBSQ HOSP IP/OBS SF/LOW 25: CPT

## 2023-04-14 PROCEDURE — 97110 THERAPEUTIC EXERCISES: CPT

## 2023-04-14 PROCEDURE — 73560 X-RAY EXAM OF KNEE 1 OR 2: CPT

## 2023-04-14 PROCEDURE — S2900: CPT

## 2023-04-14 PROCEDURE — C1713: CPT

## 2023-04-14 PROCEDURE — C1776: CPT

## 2023-04-14 PROCEDURE — 36415 COLL VENOUS BLD VENIPUNCTURE: CPT

## 2023-04-14 PROCEDURE — 80048 BASIC METABOLIC PNL TOTAL CA: CPT

## 2023-04-14 PROCEDURE — 85027 COMPLETE CBC AUTOMATED: CPT

## 2023-04-14 PROCEDURE — 82962 GLUCOSE BLOOD TEST: CPT

## 2023-04-14 PROCEDURE — C1889: CPT

## 2023-04-14 PROCEDURE — 86803 HEPATITIS C AB TEST: CPT

## 2023-04-14 PROCEDURE — 97116 GAIT TRAINING THERAPY: CPT

## 2023-04-14 PROCEDURE — 97162 PT EVAL MOD COMPLEX 30 MIN: CPT

## 2023-04-14 RX ORDER — IBUPROFEN 200 MG
1 TABLET ORAL
Refills: 0 | DISCHARGE

## 2023-04-14 RX ORDER — SENNA PLUS 8.6 MG/1
2 TABLET ORAL
Qty: 0 | Refills: 0 | DISCHARGE
Start: 2023-04-14

## 2023-04-14 RX ORDER — PANTOPRAZOLE SODIUM 20 MG/1
1 TABLET, DELAYED RELEASE ORAL
Qty: 0 | Refills: 0 | DISCHARGE
Start: 2023-04-14

## 2023-04-14 RX ORDER — CELECOXIB 200 MG/1
1 CAPSULE ORAL
Qty: 0 | Refills: 0 | DISCHARGE
Start: 2023-04-14

## 2023-04-14 RX ORDER — OXYCODONE HYDROCHLORIDE 5 MG/1
1 TABLET ORAL
Qty: 0 | Refills: 0 | DISCHARGE
Start: 2023-04-14

## 2023-04-14 RX ORDER — ACETAMINOPHEN 500 MG
3 TABLET ORAL
Qty: 0 | Refills: 0 | DISCHARGE
Start: 2023-04-14

## 2023-04-14 RX ORDER — ASPIRIN/CALCIUM CARB/MAGNESIUM 324 MG
1 TABLET ORAL
Qty: 0 | Refills: 0 | DISCHARGE
Start: 2023-04-14

## 2023-04-14 RX ORDER — ACETAMINOPHEN 500 MG
2 TABLET ORAL
Refills: 0 | DISCHARGE

## 2023-04-14 RX ADMIN — Medication 975 MILLIGRAM(S): at 06:46

## 2023-04-14 RX ADMIN — Medication 81 MILLIGRAM(S): at 18:09

## 2023-04-14 RX ADMIN — Medication 975 MILLIGRAM(S): at 22:48

## 2023-04-14 RX ADMIN — POLYETHYLENE GLYCOL 3350 17 GRAM(S): 17 POWDER, FOR SOLUTION ORAL at 21:48

## 2023-04-14 RX ADMIN — Medication 975 MILLIGRAM(S): at 21:48

## 2023-04-14 RX ADMIN — Medication 81 MILLIGRAM(S): at 05:46

## 2023-04-14 RX ADMIN — CELECOXIB 200 MILLIGRAM(S): 200 CAPSULE ORAL at 05:46

## 2023-04-14 RX ADMIN — Medication 975 MILLIGRAM(S): at 05:46

## 2023-04-14 RX ADMIN — Medication 1 TABLET(S): at 16:05

## 2023-04-14 RX ADMIN — Medication 975 MILLIGRAM(S): at 14:05

## 2023-04-14 RX ADMIN — SENNA PLUS 2 TABLET(S): 8.6 TABLET ORAL at 21:48

## 2023-04-14 RX ADMIN — CELECOXIB 200 MILLIGRAM(S): 200 CAPSULE ORAL at 06:46

## 2023-04-14 RX ADMIN — CELECOXIB 200 MILLIGRAM(S): 200 CAPSULE ORAL at 19:07

## 2023-04-14 RX ADMIN — CELECOXIB 200 MILLIGRAM(S): 200 CAPSULE ORAL at 18:09

## 2023-04-14 RX ADMIN — PANTOPRAZOLE SODIUM 40 MILLIGRAM(S): 20 TABLET, DELAYED RELEASE ORAL at 05:46

## 2023-04-14 RX ADMIN — Medication 975 MILLIGRAM(S): at 13:54

## 2023-04-14 NOTE — DISCHARGE NOTE NURSING/CASE MANAGEMENT/SOCIAL WORK - PATIENT PORTAL LINK FT
You can access the FollowMyHealth Patient Portal offered by [facility] by registering at the following website: [email or web address]. By joining [employer]’s FollowMyHealth portal, you will also be able to view your health information using other applications (apps) compatible with our system.

## 2023-04-14 NOTE — PROGRESS NOTE ADULT - PROVIDER SPECIALTY LIST ADULT
Internal Medicine
Orthopedics
Internal Medicine
Orthopedics
Orthopedics

## 2023-04-14 NOTE — PROGRESS NOTE ADULT - SUBJECTIVE AND OBJECTIVE BOX
Ortho Note    Subjective:  Pt comfortable without complaints, pain controlled with current pain medication regimen.   Denies CP, SOB, N/V, numbness/tingling   Reviewed plan of care with patient at bedside    Vital Signs Last 24 Hrs  T(C): 36.8 (04-14-23 @ 08:57), Max: 37 (04-14-23 @ 05:23)  T(F): 98.3 (04-14-23 @ 08:57), Max: 98.6 (04-14-23 @ 05:23)  HR: 61 (04-14-23 @ 08:57) (61 - 62)  BP: 109/58 (04-14-23 @ 08:57) (109/58 - 114/53)  BP(mean): --  RR: 16 (04-14-23 @ 08:57) (16 - 17)  SpO2: 98% (04-14-23 @ 08:57) (96% - 98%)  AVSS    Objective:    Physical Exam:  General: Pt Alert and oriented, NAD  Right knee aquacel DSG C/D/I  Pulses: +2 pedal pulses, wwp toes  Sensation: silt intact bilateral lower extremities  Motor: EHL/FHL/TA/GS- 5/5 bilateral lower extremities          Plan of Care:  A/P: 63yMale s/p Right TKA on 4-10  - afebrile  - Pain Control- celebrex 200mg PO Q12h, tylenol 975mg PO Q8h, Dilaudid 0.5mg Q4h IVP prn breakthrough pain, Oxycodone 5-10mg PO Q4h prn moderate to severe pain   - DVT ppx: aspirin 81mg PO BID  - PT, WBS: WBAT  - appreciate medicine recs  - bowel regimen, IS use, PPI  - ambulate with PT as tolerated  - Dispo- [facility] pending auth and acceptance, bed availability     Ortho Pager [phone]

## 2023-04-14 NOTE — PROGRESS NOTE ADULT - ASSESSMENT
62 year old male with prediabetes admitted for right knee pain     Impression and Plan   # Right Knee OA s/p Right TKA on 4/10/23   - pain control , DVT prophylaxis , Weightbearing status , Dressing changes and drain care per ortho team    # Pre Diabetes , No new medication    # DVT prophylaxis     
62 year old male with prediabetes admitted for right knee pain     Impression and Plan   # Right Knee OA s/p Right TKA on 4/10/23   - pain control , DVT prophylaxis , Weightbearing status , Dressing changes and drain care per ortho team    # Pre Diabetes , No new medication    # DVT prophylaxis

## 2023-04-14 NOTE — PROGRESS NOTE ADULT - REASON FOR ADMISSION
Right knee pain

## 2023-04-14 NOTE — PROGRESS NOTE ADULT - SUBJECTIVE AND OBJECTIVE BOX
Patient is a 63y old  Male who presents with a chief complaint of Right knee pain (12 Apr 2023 06:09)        SUBJECTIVE:  Patient was seen and examined at bedside.    Overnight Events : No new Complaints , pain controlled , participating in PT , Awaiting CODY       Review of systems: 12 point Review of systems negative unless otherwise documented elsewhere in note.     Diet, Regular (04-10-23 @ 11:19) [Active]      MEDICATIONS:  MEDICATIONS  (STANDING):  acetaminophen     Tablet .. 975 milliGRAM(s) Oral every 8 hours  aspirin  chewable 81 milliGRAM(s) Oral two times a day  celecoxib 200 milliGRAM(s) Oral every 12 hours  lactated ringers. 1000 milliLiter(s) (120 mL/Hr) IV Continuous <Continuous>  melatonin 5 milliGRAM(s) Oral at bedtime  multivitamin 1 Tablet(s) Oral daily  pantoprazole    Tablet 40 milliGRAM(s) Oral before breakfast  polyethylene glycol 3350 17 Gram(s) Oral at bedtime  senna 2 Tablet(s) Oral at bedtime    MEDICATIONS  (PRN):  HYDROmorphone  Injectable 0.5 milliGRAM(s) IV Push every 4 hours PRN breakthrough pain floors only  magnesium hydroxide Suspension 30 milliLiter(s) Oral daily PRN Constipation  ondansetron Injectable 4 milliGRAM(s) IV Push every 6 hours PRN Nausea and/or Vomiting  oxyCODONE    IR 5 milliGRAM(s) Oral every 4 hours PRN Moderate Pain (4 - 6)  oxyCODONE    IR 10 milliGRAM(s) Oral every 4 hours PRN Severe Pain (7 - 10)  traMADol 50 milliGRAM(s) Oral every 6 hours PRN Mild Pain (1 - 3)      Allergies    No Known Allergies    Intolerances        OBJECTIVE:  Vital Signs Last 24 Hrs  T(C): 36.8 (14 Apr 2023 08:57), Max: 37.4 (13 Apr 2023 15:57)  T(F): 98.3 (14 Apr 2023 08:57), Max: 99.3 (13 Apr 2023 15:57)  HR: 61 (14 Apr 2023 08:57) (61 - 78)  BP: 109/58 (14 Apr 2023 08:57) (101/65 - 150/67)  BP(mean): --  RR: 16 (14 Apr 2023 08:57) (16 - 17)  SpO2: 98% (14 Apr 2023 08:57) (94% - 99%)    Parameters below as of 14 Apr 2023 08:57  Patient On (Oxygen Delivery Method): room air                  PHYSICAL EXAM:  Gen: Resting in bed at time of exam, not in distress   HEENT: moist mucosa, no lesions   Neck: supple, trachea at midline  CV: RRR, +S1/S2  Pulm: no wheezing , no crackles  no increase in work of breathing  Abd: soft, NTND  Skin: warm and dry, no new rashes   Ext: moving all 4 extremities spontaneously , no edema  ,  Neuro: AOx3, no gross focal neurological deficits  Psych: affect and behavior appropriate, pleasant at time of interview    LABS:             No New Labs           CAPILLARY BLOOD GLUCOSE            MICRODATA:      RADIOLOGY/OTHER STUDIES:

## 2023-04-14 NOTE — PROGRESS NOTE ADULT - SUBJECTIVE AND OBJECTIVE BOX
Due to patients Right total knee arthroplasty patient is in need of transportation to the subacute rehab facility.

## 2023-04-20 DIAGNOSIS — M17.11 UNILATERAL PRIMARY OSTEOARTHRITIS, RIGHT KNEE: ICD-10-CM

## 2023-04-20 DIAGNOSIS — R73.03 PREDIABETES: ICD-10-CM

## 2023-04-25 ENCOUNTER — APPOINTMENT (OUTPATIENT)
Dept: ORTHOPEDIC SURGERY | Facility: CLINIC | Age: 63
End: 2023-04-25
Payer: MEDICAID

## 2023-04-25 VITALS
HEART RATE: 74 BPM | WEIGHT: 186 LBS | HEIGHT: 67 IN | BODY MASS INDEX: 29.19 KG/M2 | SYSTOLIC BLOOD PRESSURE: 126 MMHG | DIASTOLIC BLOOD PRESSURE: 67 MMHG | OXYGEN SATURATION: 98 %

## 2023-04-25 PROBLEM — R73.03 PREDIABETES: Chronic | Status: ACTIVE | Noted: 2023-04-10

## 2023-04-25 PROBLEM — M19.90 UNSPECIFIED OSTEOARTHRITIS, UNSPECIFIED SITE: Chronic | Status: ACTIVE | Noted: 2023-04-07

## 2023-04-25 PROCEDURE — 99024 POSTOP FOLLOW-UP VISIT: CPT

## 2023-04-25 NOTE — HISTORY OF PRESENT ILLNESS
[de-identified] : first post op for right total knee arthroplasty , surgical date 04/10/2023  [de-identified] : 64 y/o male presenting about 2 weeks s/p right TKA doing well. He denies systemic symptoms including fever, chills, or drainage from the wound. He has been taking Tylenol as needed for pain and states he is also taking about 1 oxycodone prn. He has been at the outpatient rehab center and should be there through the week. He denies complaints and is ambulating with a cane and participates in PT about 5 days per week.  [de-identified] : Gait: normal.  \par \par Right knee: \par - Focal soft tissue swelling: none\par - Ecchymosis: none\par - Erythema: none\par - Effusion: none, no Baker's cyst\par - Wounds: distal scabbing, otherwise well healed surgical incision, benign appearing. Femoral stab incisions are well healed, sutures removed by me. \par - Alignment: normal\par - Tenderness: none\par - ROM: 3-90\par - Collateral laxity: none\par - Cruciate laxity: none\par - Quad strength: 5/5 [de-identified] : 64 y/o male about 2 weeks s/p right TKA overall doing well.  [de-identified] : - We will provide an outpatient PT referral. \par - F/u in 4 weeks with right knee XR. \par - Patient was instructed to continue with Tylenol and Celebrex prn for pain.

## 2023-04-25 NOTE — END OF VISIT
[FreeTextEntry3] : All medical record entries made by the Scribe were at my, Dr. Nickolas Sosa, direction and personally dictated by me on 04/25/2023. I have reviewed the chart and agree that the record accurately reflects my personal performance of the history, physical exam, assessment and plan. I have also personally directed, reviewed, and agreed with the chart.

## 2023-04-25 NOTE — ADDENDUM
[FreeTextEntry1] : Documented by Liz Zavala acting as a scribe for Dr. Nickolas Sosa on 04/25/2023.

## 2023-05-24 ENCOUNTER — OUTPATIENT (OUTPATIENT)
Dept: OUTPATIENT SERVICES | Facility: HOSPITAL | Age: 63
LOS: 1 days | End: 2023-05-24
Payer: MEDICAID

## 2023-05-24 ENCOUNTER — RESULT REVIEW (OUTPATIENT)
Age: 63
End: 2023-05-24

## 2023-05-24 ENCOUNTER — APPOINTMENT (OUTPATIENT)
Dept: ORTHOPEDIC SURGERY | Facility: CLINIC | Age: 63
End: 2023-05-24
Payer: MEDICAID

## 2023-05-24 VITALS
WEIGHT: 186 LBS | SYSTOLIC BLOOD PRESSURE: 116 MMHG | BODY MASS INDEX: 29.19 KG/M2 | DIASTOLIC BLOOD PRESSURE: 68 MMHG | HEART RATE: 73 BPM | HEIGHT: 67 IN | OXYGEN SATURATION: 96 %

## 2023-05-24 DIAGNOSIS — M17.0 BILATERAL PRIMARY OSTEOARTHRITIS OF KNEE: ICD-10-CM

## 2023-05-24 DIAGNOSIS — Z98.890 OTHER SPECIFIED POSTPROCEDURAL STATES: Chronic | ICD-10-CM

## 2023-05-24 PROCEDURE — 99024 POSTOP FOLLOW-UP VISIT: CPT

## 2023-05-24 PROCEDURE — 73564 X-RAY EXAM KNEE 4 OR MORE: CPT | Mod: 26,RT

## 2023-05-24 PROCEDURE — 73564 X-RAY EXAM KNEE 4 OR MORE: CPT

## 2023-05-24 RX ORDER — CELECOXIB 200 MG/1
200 CAPSULE ORAL TWICE DAILY
Qty: 60 | Refills: 2 | Status: ACTIVE | COMMUNITY
Start: 2023-04-07 | End: 1900-01-01

## 2023-05-24 NOTE — PHYSICAL EXAM
[de-identified] :  General appearance: well nourished and hydrated, pleasant, alert and oriented x 3, cooperative.  \par HEENT: normocephalic, EOM intact, wearing mask, external auditory canal clear.  \par Cardiovascular: no lower leg edema, no varicosities, dorsalis pedis pulses palpable and symmetric.  \par Lymphatics: no palpable lymphadenopathy, no lymphedema.  \par Neurologic: sensation is normal, no muscle weakness in upper or lower extremities, patella tendon reflexes present and symmetric.  \par Dermatologic: skin moist, warm, no rash.  \par Spine: cervical spine with normal lordosis and painless range of motion, thoracic spine with normal kyphosis and painless range of motion, lumbosacral spine with normal lordosis and painless range of motion.  No tenderness to palpation along midline spine and paraspinal musculature.  Sacroiliac joints nontender bilaterally. Negative SLR and crossed SLR tests bilaterally.\par Gait: presents with a cane and demonstrates left sided antalgia and left varus thrust pattern\par \par Right knee: \par - Focal soft tissue swelling: none\par - Ecchymosis: none\par - Erythema: none\par - Effusion: trace, no Baker's cyst\par - Wounds: well midline surgical incision, benign appearing \par - Alignment: normal\par - Tenderness: none\par - ROM: 0-110\par - Collateral laxity: none\par - Cruciate laxity: none\par - Popliteal angle (degrees): 30\par - Quad strength: 5/5 [de-identified] : 4 radiographic views were taken of the right knee. These demonstrate stable position of the right TKA as compared to previous imaging without evidence of mechanical complication. Patella sits at normal height and tracks centrally.

## 2023-05-24 NOTE — END OF VISIT
[FreeTextEntry3] : All medical record entries made by the Scribe were at my, Dr. Nickolas Sosa, direction and personally dictated by me on 05/24/2023. I have reviewed the chart and agree that the record accurately reflects my personal performance of the history, physical exam, assessment and plan. I have also personally directed, reviewed, and agreed with the chart.

## 2023-05-24 NOTE — ADDENDUM
[FreeTextEntry1] : Documented by Liz Zavala acting as a scribe for Dr. Nickolas Sosa on 05/24/2023.

## 2023-05-24 NOTE — DISCUSSION/SUMMARY
[de-identified] : 64 y/o male now approximately 6 weeks s/p right TKA doing well with left knee OA now approximately 5 months s/p left tibia intramedullary nailing for distal 1/3 shaft fracture. \par - He is doing very well with respect to the right knee and I have no particular concerns. I encouraged him to continue with PT, HEP, and Tylenol and celecoxib as needed. I encouraged him to d/c the use of his cane. \par - Regarding the left knee, his symptoms are relatively mild to moderate at this time. It is likely we will need to move towards a left TKA but the timing of this would be contingent on removal first of the left tibial nail which I would perform no earlier than December 2024. \par - I offered him injection modalities to the left knee today which he declined. Rather, he will use a soft knee brace and will continue with exercise and oral analgesics as needed. \par - RTC in 2 months with repeat b/l knee XR.

## 2023-07-26 ENCOUNTER — APPOINTMENT (OUTPATIENT)
Dept: ORTHOPEDIC SURGERY | Facility: CLINIC | Age: 63
End: 2023-07-26
Payer: MEDICAID

## 2023-07-26 ENCOUNTER — RESULT REVIEW (OUTPATIENT)
Age: 63
End: 2023-07-26

## 2023-07-26 ENCOUNTER — OUTPATIENT (OUTPATIENT)
Dept: OUTPATIENT SERVICES | Facility: HOSPITAL | Age: 63
LOS: 1 days | End: 2023-07-26
Payer: MEDICAID

## 2023-07-26 VITALS
DIASTOLIC BLOOD PRESSURE: 65 MMHG | WEIGHT: 186 LBS | HEART RATE: 92 BPM | OXYGEN SATURATION: 97 % | SYSTOLIC BLOOD PRESSURE: 118 MMHG | HEIGHT: 67 IN | BODY MASS INDEX: 29.19 KG/M2

## 2023-07-26 DIAGNOSIS — Z47.1 AFTERCARE FOLLOWING JOINT REPLACEMENT SURGERY: ICD-10-CM

## 2023-07-26 DIAGNOSIS — Z96.651 AFTERCARE FOLLOWING JOINT REPLACEMENT SURGERY: ICD-10-CM

## 2023-07-26 PROCEDURE — 73564 X-RAY EXAM KNEE 4 OR MORE: CPT | Mod: 26,LT,RT

## 2023-07-26 PROCEDURE — 20610 DRAIN/INJ JOINT/BURSA W/O US: CPT | Mod: LT

## 2023-07-26 PROCEDURE — 73564 X-RAY EXAM KNEE 4 OR MORE: CPT

## 2023-07-26 PROCEDURE — 99214 OFFICE O/P EST MOD 30 MIN: CPT | Mod: 25

## 2023-07-27 NOTE — HISTORY OF PRESENT ILLNESS
[de-identified] : R TKA 4/10/23\par \par 07/26/2023: 64 y/o male following up now on his third post-operative visit following right total knee arthroplasty performed on April 10, 2023. He reports that he is doing very well with respect to the right knee and is minimal to no pain which is well controlled with intermittent doses of Tylenol. He is able to walk up to 15 blocks at a time and notes that currently his main functional barrier is his progressively increasing left knee osteoarthritis pain. He would like to receive a left knee cortisone injection today.\par \par 05/24/2023: 64 y/o male presenting about 6 weeks s/p right TKA overall doing well. He has been participating in PT currently and is taking Tylenol as needed for pain. Overall, his right knee is improving. He does have left knee instability and occasional buckling. He is considering left total knee replacement but not in the immediate future. He would like a left knee brace until he is able to move forward with surgery for the left knee. \par \par 03/22/2023: 61 y/o Ma[place] speaking male presenting for evaluation of right knee pain. He has been having this pain for the past 5 years and it is progressively worsening. He rates his pain as 8/10. He localizes the pain to the entire knee, worse with walking, and is taking Tylenol and ibuprofen as needed for pain. He notes a walking distance limitation of up to 3 blocks with a cane before having to stop and take a break. Past interventions include PT over 2 years ago which did not provide good relief. He has had 3 right knee CSI injections 2 years ago which also did not provide much relief. He would like to discuss right TKA today.\par \par PMHx includes left knee fracture 3 months ago following a misstep on stairs, prediabetes. He denies allergies and he currently lives alone in a house.

## 2023-07-27 NOTE — PROCEDURE
[de-identified] : Procedure: Knee joint injection\par Laterality: Left\par Indication: Osteoarthritis - discussed with patient\par Skin prep: alcohol and chlorhexidine\par Anesthesia: ethyl chloride spray\par Needle: 20-gauge\par Portal: inferolateral\par Aspiration: none\par Injectate: 2cc of 2% lidocaine, 2cc of 0.5% bupivacaine, and 1cc of 40mg/mL triamcinolone\par Dressing: Band-aid\par Complications: None\par

## 2023-07-27 NOTE — END OF VISIT
[FreeTextEntry3] : Documented by Leonel Velazco acting as a scribe for Dr. Nickolas Sosa. 07/26/2023\par \par All medical record entries made by the Scribe were at my, Dr. Nickolas Sosa, direction and\par personally dictated by me on 07/26/2023 . I have reviewed the chart and agree that the record\par accurately reflects my personal performance of the history, physical exam, assessment and\par plan. I have also personally directed, reviewed, and agreed with the chart.\par

## 2023-07-27 NOTE — DISCUSSION/SUMMARY
[de-identified] : 62 y/o male three and a half months status post right knee arthroplasty doing well with severe left knee osteoarthritis. Now approximately 7 months status post left distal third tibial shaft intramedullary nailing. \par - He's doing well with respect to the right knee and no additional treatment is required for it at this time.\par - I recommended that he continue with home exercise program and ambulate with a target of 8-10,000 steps per day. \par - Regarding the left knee, he comes with a letter from his care team at [facility] insisting that the fracture is healed. However, I again stressed that I am not willing to remove his current hardware until one year has passed from his fracture fixation surgery which would be in December of 2023. \par - To address his left knee pain, I administered a left knee corticosteroid injection today.\par - He can follow-up next in 3 months as needed for left knee cortisone and likely for booking a left tibia removal of hardware at that time. \par - At the next visit, left tibia and fibula X-rays should be repeated AP and lateral.

## 2023-07-27 NOTE — PHYSICAL EXAM
[de-identified] : General appearance: well nourished and hydrated, pleasant, alert and oriented x 3, cooperative.  \par HEENT: normocephalic, EOM intact, wearing mask, external auditory canal clear.  \par Cardiovascular: no lower leg edema, no varicosities, dorsalis pedis pulses palpable and symmetric.  \par Lymphatics: no palpable lymphadenopathy, no lymphedema.  \par Neurologic: sensation is normal, no muscle weakness in upper or lower extremities, patella tendon reflexes present and symmetric.  \par Dermatologic: skin moist, warm, no rash.  \par Spine: cervical spine with normal lordosis and painless range of motion, thoracic spine with normal kyphosis and painless range of motion, lumbosacral spine with normal lordosis and painless range of motion.  No tenderness to palpation along midline spine and paraspinal musculature.  Sacroiliac joints nontender bilaterally. Negative SLR and crossed SLR tests bilaterally.\par Gait: He does present carrying a cane but not using it and demonstrates mild left-sided antalgia.\par \par Left knee: \par - Focal soft tissue swelling: none\par - Ecchymosis: none\par - Erythema: none\par - Effusion: Trace w/ no palpable Baker's cyst\par - Wounds: Well healed suprapatellar tibial nailing portals\par - Alignment: Marked varus malalignment\par - Tenderness: none\par - ROM: 0-130\par - Collateral laxity: none\par - Cruciate laxity: none\par - Popliteal angle (degrees): 30\par - Quad strength: 5/5\par \par Right knee:\par - Focal soft tissue swelling: none\par - Ecchymosis: none\par - Erythema: none\par - Effusion: Trace w/ no palpable Baker's cyst\par - Wounds: Well healed midline incision, benign appearing\par - Alignment: normal\par - Tenderness: none\par - ROM: 0-125\par - Collateral laxity: none\par - Cruciate laxity: none\par - Popliteal angle (degrees): 35\par - Quad strength: 5/5 [de-identified] : 4 radiographic views were taken of the bilateral knees. These demonstrate stable position of his right total knee arthroplasty without evidence of mechanical complication. All components continue to appear well fixed and unchanged position as compared to prior imaging. \par \par Right knee -- \par Patellar height: Normal\par Patellar tracking: Centrally\par \par Left knee -- \par Alignment: Varus malalignment\par Arthritis: Tricompartmental osteoarthritis most pronounced medially. Kellgren and Domingo 4 with bone-on-bone articulation. \par Patellar height: Normal\par Patellar tracking: Centrally. 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 The proximal aspect of the tibial nail is re-demonstrated with unchanged alignment without evidence of any new mechanical complication as compared to prior imaging.

## 2023-10-27 ENCOUNTER — RESULT REVIEW (OUTPATIENT)
Age: 63
End: 2023-10-27

## 2023-10-27 ENCOUNTER — OUTPATIENT (OUTPATIENT)
Dept: OUTPATIENT SERVICES | Facility: HOSPITAL | Age: 63
LOS: 1 days | End: 2023-10-27
Payer: MEDICAID

## 2023-10-27 ENCOUNTER — APPOINTMENT (OUTPATIENT)
Dept: ORTHOPEDIC SURGERY | Facility: CLINIC | Age: 63
End: 2023-10-27
Payer: MEDICAID

## 2023-10-27 VITALS
WEIGHT: 185 LBS | BODY MASS INDEX: 29.03 KG/M2 | OXYGEN SATURATION: 97 % | HEART RATE: 76 BPM | DIASTOLIC BLOOD PRESSURE: 78 MMHG | SYSTOLIC BLOOD PRESSURE: 129 MMHG | HEIGHT: 67 IN

## 2023-10-27 DIAGNOSIS — Z98.890 OTHER SPECIFIED POSTPROCEDURAL STATES: Chronic | ICD-10-CM

## 2023-10-27 DIAGNOSIS — Z01.818 ENCOUNTER FOR OTHER PREPROCEDURAL EXAMINATION: ICD-10-CM

## 2023-10-27 LAB
A1C WITH ESTIMATED AVERAGE GLUCOSE RESULT: 5.5 % — SIGNIFICANT CHANGE UP (ref 4–5.6)
A1C WITH ESTIMATED AVERAGE GLUCOSE RESULT: 5.5 % — SIGNIFICANT CHANGE UP (ref 4–5.6)
ALBUMIN SERPL ELPH-MCNC: 4.5 G/DL — SIGNIFICANT CHANGE UP (ref 3.3–5)
ALBUMIN SERPL ELPH-MCNC: 4.5 G/DL — SIGNIFICANT CHANGE UP (ref 3.3–5)
ALP SERPL-CCNC: 70 U/L — SIGNIFICANT CHANGE UP (ref 40–120)
ALP SERPL-CCNC: 70 U/L — SIGNIFICANT CHANGE UP (ref 40–120)
ALT FLD-CCNC: 19 U/L — SIGNIFICANT CHANGE UP (ref 10–45)
ALT FLD-CCNC: 19 U/L — SIGNIFICANT CHANGE UP (ref 10–45)
ANION GAP SERPL CALC-SCNC: 7 MMOL/L — SIGNIFICANT CHANGE UP (ref 5–17)
ANION GAP SERPL CALC-SCNC: 7 MMOL/L — SIGNIFICANT CHANGE UP (ref 5–17)
APPEARANCE UR: CLEAR — SIGNIFICANT CHANGE UP
APPEARANCE UR: CLEAR — SIGNIFICANT CHANGE UP
APTT BLD: 29.3 SEC — SIGNIFICANT CHANGE UP (ref 24.5–35.6)
APTT BLD: 29.3 SEC — SIGNIFICANT CHANGE UP (ref 24.5–35.6)
AST SERPL-CCNC: 23 U/L — SIGNIFICANT CHANGE UP (ref 10–40)
AST SERPL-CCNC: 23 U/L — SIGNIFICANT CHANGE UP (ref 10–40)
BASOPHILS # BLD AUTO: 0.04 K/UL — SIGNIFICANT CHANGE UP (ref 0–0.2)
BASOPHILS # BLD AUTO: 0.04 K/UL — SIGNIFICANT CHANGE UP (ref 0–0.2)
BASOPHILS NFR BLD AUTO: 0.5 % — SIGNIFICANT CHANGE UP (ref 0–2)
BASOPHILS NFR BLD AUTO: 0.5 % — SIGNIFICANT CHANGE UP (ref 0–2)
BILIRUB SERPL-MCNC: 0.5 MG/DL — SIGNIFICANT CHANGE UP (ref 0.2–1.2)
BILIRUB SERPL-MCNC: 0.5 MG/DL — SIGNIFICANT CHANGE UP (ref 0.2–1.2)
BILIRUB UR-MCNC: NEGATIVE — SIGNIFICANT CHANGE UP
BILIRUB UR-MCNC: NEGATIVE — SIGNIFICANT CHANGE UP
BUN SERPL-MCNC: 19 MG/DL — SIGNIFICANT CHANGE UP (ref 7–23)
BUN SERPL-MCNC: 19 MG/DL — SIGNIFICANT CHANGE UP (ref 7–23)
CALCIUM SERPL-MCNC: 9 MG/DL — SIGNIFICANT CHANGE UP (ref 8.4–10.5)
CALCIUM SERPL-MCNC: 9 MG/DL — SIGNIFICANT CHANGE UP (ref 8.4–10.5)
CHLORIDE SERPL-SCNC: 102 MMOL/L — SIGNIFICANT CHANGE UP (ref 96–108)
CHLORIDE SERPL-SCNC: 102 MMOL/L — SIGNIFICANT CHANGE UP (ref 96–108)
CO2 SERPL-SCNC: 30 MMOL/L — SIGNIFICANT CHANGE UP (ref 22–31)
CO2 SERPL-SCNC: 30 MMOL/L — SIGNIFICANT CHANGE UP (ref 22–31)
COLOR SPEC: YELLOW — SIGNIFICANT CHANGE UP
COLOR SPEC: YELLOW — SIGNIFICANT CHANGE UP
CREAT SERPL-MCNC: 0.91 MG/DL — SIGNIFICANT CHANGE UP (ref 0.5–1.3)
CREAT SERPL-MCNC: 0.91 MG/DL — SIGNIFICANT CHANGE UP (ref 0.5–1.3)
DIFF PNL FLD: NEGATIVE — SIGNIFICANT CHANGE UP
DIFF PNL FLD: NEGATIVE — SIGNIFICANT CHANGE UP
EGFR: 95 ML/MIN/1.73M2 — SIGNIFICANT CHANGE UP
EGFR: 95 ML/MIN/1.73M2 — SIGNIFICANT CHANGE UP
EOSINOPHIL # BLD AUTO: 0.16 K/UL — SIGNIFICANT CHANGE UP (ref 0–0.5)
EOSINOPHIL # BLD AUTO: 0.16 K/UL — SIGNIFICANT CHANGE UP (ref 0–0.5)
EOSINOPHIL NFR BLD AUTO: 2.1 % — SIGNIFICANT CHANGE UP (ref 0–6)
EOSINOPHIL NFR BLD AUTO: 2.1 % — SIGNIFICANT CHANGE UP (ref 0–6)
ESTIMATED AVERAGE GLUCOSE: 111 MG/DL — SIGNIFICANT CHANGE UP (ref 68–114)
ESTIMATED AVERAGE GLUCOSE: 111 MG/DL — SIGNIFICANT CHANGE UP (ref 68–114)
GLUCOSE SERPL-MCNC: 89 MG/DL — SIGNIFICANT CHANGE UP (ref 70–99)
GLUCOSE SERPL-MCNC: 89 MG/DL — SIGNIFICANT CHANGE UP (ref 70–99)
GLUCOSE UR QL: NEGATIVE MG/DL — SIGNIFICANT CHANGE UP
GLUCOSE UR QL: NEGATIVE MG/DL — SIGNIFICANT CHANGE UP
HCT VFR BLD CALC: 40.8 % — SIGNIFICANT CHANGE UP (ref 39–50)
HCT VFR BLD CALC: 40.8 % — SIGNIFICANT CHANGE UP (ref 39–50)
HGB BLD-MCNC: 13.4 G/DL — SIGNIFICANT CHANGE UP (ref 13–17)
HGB BLD-MCNC: 13.4 G/DL — SIGNIFICANT CHANGE UP (ref 13–17)
IMM GRANULOCYTES NFR BLD AUTO: 0.3 % — SIGNIFICANT CHANGE UP (ref 0–0.9)
IMM GRANULOCYTES NFR BLD AUTO: 0.3 % — SIGNIFICANT CHANGE UP (ref 0–0.9)
INR BLD: 0.86 — SIGNIFICANT CHANGE UP (ref 0.85–1.18)
INR BLD: 0.86 — SIGNIFICANT CHANGE UP (ref 0.85–1.18)
KETONES UR-MCNC: NEGATIVE MG/DL — SIGNIFICANT CHANGE UP
KETONES UR-MCNC: NEGATIVE MG/DL — SIGNIFICANT CHANGE UP
LEUKOCYTE ESTERASE UR-ACNC: NEGATIVE — SIGNIFICANT CHANGE UP
LEUKOCYTE ESTERASE UR-ACNC: NEGATIVE — SIGNIFICANT CHANGE UP
LYMPHOCYTES # BLD AUTO: 1.14 K/UL — SIGNIFICANT CHANGE UP (ref 1–3.3)
LYMPHOCYTES # BLD AUTO: 1.14 K/UL — SIGNIFICANT CHANGE UP (ref 1–3.3)
LYMPHOCYTES # BLD AUTO: 15.2 % — SIGNIFICANT CHANGE UP (ref 13–44)
LYMPHOCYTES # BLD AUTO: 15.2 % — SIGNIFICANT CHANGE UP (ref 13–44)
MCHC RBC-ENTMCNC: 29.6 PG — SIGNIFICANT CHANGE UP (ref 27–34)
MCHC RBC-ENTMCNC: 29.6 PG — SIGNIFICANT CHANGE UP (ref 27–34)
MCHC RBC-ENTMCNC: 32.8 GM/DL — SIGNIFICANT CHANGE UP (ref 32–36)
MCHC RBC-ENTMCNC: 32.8 GM/DL — SIGNIFICANT CHANGE UP (ref 32–36)
MCV RBC AUTO: 90.1 FL — SIGNIFICANT CHANGE UP (ref 80–100)
MCV RBC AUTO: 90.1 FL — SIGNIFICANT CHANGE UP (ref 80–100)
MONOCYTES # BLD AUTO: 0.64 K/UL — SIGNIFICANT CHANGE UP (ref 0–0.9)
MONOCYTES # BLD AUTO: 0.64 K/UL — SIGNIFICANT CHANGE UP (ref 0–0.9)
MONOCYTES NFR BLD AUTO: 8.5 % — SIGNIFICANT CHANGE UP (ref 2–14)
MONOCYTES NFR BLD AUTO: 8.5 % — SIGNIFICANT CHANGE UP (ref 2–14)
NEUTROPHILS # BLD AUTO: 5.51 K/UL — SIGNIFICANT CHANGE UP (ref 1.8–7.4)
NEUTROPHILS # BLD AUTO: 5.51 K/UL — SIGNIFICANT CHANGE UP (ref 1.8–7.4)
NEUTROPHILS NFR BLD AUTO: 73.4 % — SIGNIFICANT CHANGE UP (ref 43–77)
NEUTROPHILS NFR BLD AUTO: 73.4 % — SIGNIFICANT CHANGE UP (ref 43–77)
NITRITE UR-MCNC: NEGATIVE — SIGNIFICANT CHANGE UP
NITRITE UR-MCNC: NEGATIVE — SIGNIFICANT CHANGE UP
NRBC # BLD: 0 /100 WBCS — SIGNIFICANT CHANGE UP (ref 0–0)
NRBC # BLD: 0 /100 WBCS — SIGNIFICANT CHANGE UP (ref 0–0)
PH UR: 6 — SIGNIFICANT CHANGE UP (ref 5–8)
PH UR: 6 — SIGNIFICANT CHANGE UP (ref 5–8)
PLATELET # BLD AUTO: 189 K/UL — SIGNIFICANT CHANGE UP (ref 150–400)
PLATELET # BLD AUTO: 189 K/UL — SIGNIFICANT CHANGE UP (ref 150–400)
POTASSIUM SERPL-MCNC: 4.9 MMOL/L — SIGNIFICANT CHANGE UP (ref 3.5–5.3)
POTASSIUM SERPL-MCNC: 4.9 MMOL/L — SIGNIFICANT CHANGE UP (ref 3.5–5.3)
POTASSIUM SERPL-SCNC: 4.9 MMOL/L — SIGNIFICANT CHANGE UP (ref 3.5–5.3)
POTASSIUM SERPL-SCNC: 4.9 MMOL/L — SIGNIFICANT CHANGE UP (ref 3.5–5.3)
PROT SERPL-MCNC: 6.9 G/DL — SIGNIFICANT CHANGE UP (ref 6–8.3)
PROT SERPL-MCNC: 6.9 G/DL — SIGNIFICANT CHANGE UP (ref 6–8.3)
PROT UR-MCNC: NEGATIVE MG/DL — SIGNIFICANT CHANGE UP
PROT UR-MCNC: NEGATIVE MG/DL — SIGNIFICANT CHANGE UP
PROTHROM AB SERPL-ACNC: 9.9 SEC — SIGNIFICANT CHANGE UP (ref 9.5–13)
PROTHROM AB SERPL-ACNC: 9.9 SEC — SIGNIFICANT CHANGE UP (ref 9.5–13)
RBC # BLD: 4.53 M/UL — SIGNIFICANT CHANGE UP (ref 4.2–5.8)
RBC # BLD: 4.53 M/UL — SIGNIFICANT CHANGE UP (ref 4.2–5.8)
RBC # FLD: 13.5 % — SIGNIFICANT CHANGE UP (ref 10.3–14.5)
RBC # FLD: 13.5 % — SIGNIFICANT CHANGE UP (ref 10.3–14.5)
SODIUM SERPL-SCNC: 139 MMOL/L — SIGNIFICANT CHANGE UP (ref 135–145)
SODIUM SERPL-SCNC: 139 MMOL/L — SIGNIFICANT CHANGE UP (ref 135–145)
SP GR SPEC: 1.02 — SIGNIFICANT CHANGE UP (ref 1–1.03)
SP GR SPEC: 1.02 — SIGNIFICANT CHANGE UP (ref 1–1.03)
UROBILINOGEN FLD QL: 1 MG/DL — SIGNIFICANT CHANGE UP (ref 0.2–1)
UROBILINOGEN FLD QL: 1 MG/DL — SIGNIFICANT CHANGE UP (ref 0.2–1)
WBC # BLD: 7.51 K/UL — SIGNIFICANT CHANGE UP (ref 3.8–10.5)
WBC # BLD: 7.51 K/UL — SIGNIFICANT CHANGE UP (ref 3.8–10.5)
WBC # FLD AUTO: 7.51 K/UL — SIGNIFICANT CHANGE UP (ref 3.8–10.5)
WBC # FLD AUTO: 7.51 K/UL — SIGNIFICANT CHANGE UP (ref 3.8–10.5)

## 2023-10-27 PROCEDURE — 73590 X-RAY EXAM OF LOWER LEG: CPT | Mod: 26,LT

## 2023-10-27 PROCEDURE — 85730 THROMBOPLASTIN TIME PARTIAL: CPT

## 2023-10-27 PROCEDURE — 93005 ELECTROCARDIOGRAM TRACING: CPT

## 2023-10-27 PROCEDURE — 99214 OFFICE O/P EST MOD 30 MIN: CPT

## 2023-10-27 PROCEDURE — 93010 ELECTROCARDIOGRAM REPORT: CPT | Mod: NC

## 2023-10-27 PROCEDURE — 81003 URINALYSIS AUTO W/O SCOPE: CPT

## 2023-10-27 PROCEDURE — 87086 URINE CULTURE/COLONY COUNT: CPT

## 2023-10-27 PROCEDURE — 85610 PROTHROMBIN TIME: CPT

## 2023-10-27 PROCEDURE — 80053 COMPREHEN METABOLIC PANEL: CPT

## 2023-10-27 PROCEDURE — 73590 X-RAY EXAM OF LOWER LEG: CPT

## 2023-10-27 PROCEDURE — 83036 HEMOGLOBIN GLYCOSYLATED A1C: CPT

## 2023-10-27 PROCEDURE — 85025 COMPLETE CBC W/AUTO DIFF WBC: CPT

## 2023-10-28 LAB
CULTURE RESULTS: NO GROWTH — SIGNIFICANT CHANGE UP
CULTURE RESULTS: NO GROWTH — SIGNIFICANT CHANGE UP
SPECIMEN SOURCE: SIGNIFICANT CHANGE UP
SPECIMEN SOURCE: SIGNIFICANT CHANGE UP

## 2023-11-08 ENCOUNTER — APPOINTMENT (OUTPATIENT)
Dept: ORTHOPEDIC SURGERY | Facility: CLINIC | Age: 63
End: 2023-11-08
Payer: MEDICAID

## 2023-11-08 VITALS
HEIGHT: 67 IN | BODY MASS INDEX: 29.03 KG/M2 | DIASTOLIC BLOOD PRESSURE: 71 MMHG | WEIGHT: 185 LBS | HEART RATE: 78 BPM | TEMPERATURE: 98.3 F | OXYGEN SATURATION: 96 % | SYSTOLIC BLOOD PRESSURE: 137 MMHG

## 2023-11-08 PROCEDURE — 99214 OFFICE O/P EST MOD 30 MIN: CPT

## 2023-11-17 VITALS
HEIGHT: 67 IN | HEART RATE: 67 BPM | WEIGHT: 182.54 LBS | RESPIRATION RATE: 16 BRPM | OXYGEN SATURATION: 98 % | DIASTOLIC BLOOD PRESSURE: 69 MMHG | TEMPERATURE: 98 F | SYSTOLIC BLOOD PRESSURE: 133 MMHG

## 2023-11-17 NOTE — ASU PATIENT PROFILE, ADULT - NSICDXPASTSURGICALHX_GEN_ALL_CORE_FT
PAST SURGICAL HISTORY:  H/O knee surgery LEFT KNEE FRACTURE     PAST SURGICAL HISTORY:  H/O fracture of tibia left fx repair with hardwaare    H/O knee surgery LEFT KNEE FRACTURE

## 2023-11-17 NOTE — ASU PATIENT PROFILE, ADULT - FALL HARM RISK - UNIVERSAL INTERVENTIONS
Bed in lowest position, wheels locked, appropriate side rails in place/Call bell, personal items and telephone in reach/Instruct patient to call for assistance before getting out of bed or chair/Non-slip footwear when patient is out of bed/[place] to call system/Physically safe environment - no spills, clutter or unnecessary equipment/Purposeful Proactive Rounding/Room/bathroom lighting operational, light cord in reach

## 2023-11-19 ENCOUNTER — TRANSCRIPTION ENCOUNTER (OUTPATIENT)
Age: 63
End: 2023-11-19

## 2023-11-19 RX ORDER — OXYCODONE 5 MG/1
5 TABLET ORAL
Qty: 50 | Refills: 0 | Status: ACTIVE | COMMUNITY
Start: 2023-11-19 | End: 1900-01-01

## 2023-11-19 RX ORDER — ACETAMINOPHEN 500 MG/1
500 TABLET ORAL
Qty: 180 | Refills: 1 | Status: ACTIVE | COMMUNITY
Start: 2023-11-19 | End: 1900-01-01

## 2023-11-19 RX ORDER — CELECOXIB 200 MG/1
200 CAPSULE ORAL TWICE DAILY
Qty: 60 | Refills: 2 | Status: ACTIVE | COMMUNITY
Start: 2023-11-19 | End: 1900-01-01

## 2023-11-20 ENCOUNTER — APPOINTMENT (OUTPATIENT)
Dept: ORTHOPEDIC SURGERY | Facility: HOSPITAL | Age: 63
End: 2023-11-20

## 2023-11-20 ENCOUNTER — INPATIENT (INPATIENT)
Facility: HOSPITAL | Age: 63
LOS: 0 days | Discharge: HOME | DRG: 497 | End: 2023-11-21
Attending: ORTHOPAEDIC SURGERY | Admitting: ORTHOPAEDIC SURGERY
Payer: MEDICAID

## 2023-11-20 ENCOUNTER — RESULT REVIEW (OUTPATIENT)
Age: 63
End: 2023-11-20

## 2023-11-20 DIAGNOSIS — Z87.81 PERSONAL HISTORY OF (HEALED) TRAUMATIC FRACTURE: Chronic | ICD-10-CM

## 2023-11-20 DIAGNOSIS — R73.03 PREDIABETES: ICD-10-CM

## 2023-11-20 DIAGNOSIS — Z98.890 OTHER SPECIFIED POSTPROCEDURAL STATES: Chronic | ICD-10-CM

## 2023-11-20 DIAGNOSIS — M19.90 UNSPECIFIED OSTEOARTHRITIS, UNSPECIFIED SITE: ICD-10-CM

## 2023-11-20 LAB
GLUCOSE BLDC GLUCOMTR-MCNC: 88 MG/DL — SIGNIFICANT CHANGE UP (ref 70–99)
GLUCOSE BLDC GLUCOMTR-MCNC: 88 MG/DL — SIGNIFICANT CHANGE UP (ref 70–99)

## 2023-11-20 PROCEDURE — 73590 X-RAY EXAM OF LOWER LEG: CPT | Mod: 26,LT

## 2023-11-20 PROCEDURE — 20680 REMOVAL OF IMPLANT DEEP: CPT | Mod: LT

## 2023-11-20 PROCEDURE — 73560 X-RAY EXAM OF KNEE 1 OR 2: CPT | Mod: 26,LT

## 2023-11-20 RX ORDER — KETOROLAC TROMETHAMINE 30 MG/ML
15 SYRINGE (ML) INJECTION EVERY 6 HOURS
Refills: 0 | Status: DISCONTINUED | OUTPATIENT
Start: 2023-11-20 | End: 2023-11-21

## 2023-11-20 RX ORDER — ONDANSETRON 8 MG/1
4 TABLET, FILM COATED ORAL EVERY 4 HOURS
Refills: 0 | Status: DISCONTINUED | OUTPATIENT
Start: 2023-11-20 | End: 2023-11-21

## 2023-11-20 RX ORDER — ACETAMINOPHEN 500 MG
1000 TABLET ORAL ONCE
Refills: 0 | Status: COMPLETED | OUTPATIENT
Start: 2023-11-20 | End: 2023-11-20

## 2023-11-20 RX ORDER — HYDROMORPHONE HYDROCHLORIDE 2 MG/ML
0.5 INJECTION INTRAMUSCULAR; INTRAVENOUS; SUBCUTANEOUS
Refills: 0 | Status: DISCONTINUED | OUTPATIENT
Start: 2023-11-20 | End: 2023-11-21

## 2023-11-20 RX ORDER — PANTOPRAZOLE SODIUM 20 MG/1
40 TABLET, DELAYED RELEASE ORAL
Refills: 0 | Status: DISCONTINUED | OUTPATIENT
Start: 2023-11-20 | End: 2023-11-21

## 2023-11-20 RX ORDER — OXYCODONE HYDROCHLORIDE 5 MG/1
10 TABLET ORAL EVERY 4 HOURS
Refills: 0 | Status: DISCONTINUED | OUTPATIENT
Start: 2023-11-20 | End: 2023-11-21

## 2023-11-20 RX ORDER — MAGNESIUM HYDROXIDE 400 MG/1
30 TABLET, CHEWABLE ORAL DAILY
Refills: 0 | Status: DISCONTINUED | OUTPATIENT
Start: 2023-11-20 | End: 2023-11-21

## 2023-11-20 RX ORDER — SENNA PLUS 8.6 MG/1
2 TABLET ORAL AT BEDTIME
Refills: 0 | Status: DISCONTINUED | OUTPATIENT
Start: 2023-11-20 | End: 2023-11-21

## 2023-11-20 RX ORDER — POLYETHYLENE GLYCOL 3350 17 G/17G
17 POWDER, FOR SOLUTION ORAL AT BEDTIME
Refills: 0 | Status: DISCONTINUED | OUTPATIENT
Start: 2023-11-20 | End: 2023-11-21

## 2023-11-20 RX ORDER — ACETAMINOPHEN 500 MG
1000 TABLET ORAL EVERY 8 HOURS
Refills: 0 | Status: DISCONTINUED | OUTPATIENT
Start: 2023-11-20 | End: 2023-11-21

## 2023-11-20 RX ORDER — HYDROMORPHONE HYDROCHLORIDE 2 MG/ML
0.5 INJECTION INTRAMUSCULAR; INTRAVENOUS; SUBCUTANEOUS EVERY 4 HOURS
Refills: 0 | Status: DISCONTINUED | OUTPATIENT
Start: 2023-11-20 | End: 2023-11-21

## 2023-11-20 RX ORDER — OXYCODONE HYDROCHLORIDE 5 MG/1
5 TABLET ORAL EVERY 4 HOURS
Refills: 0 | Status: DISCONTINUED | OUTPATIENT
Start: 2023-11-20 | End: 2023-11-21

## 2023-11-20 RX ORDER — HYDROMORPHONE HYDROCHLORIDE 2 MG/ML
0.5 INJECTION INTRAMUSCULAR; INTRAVENOUS; SUBCUTANEOUS
Refills: 0 | Status: COMPLETED | OUTPATIENT
Start: 2023-11-20 | End: 2023-11-27

## 2023-11-20 RX ORDER — POVIDONE-IODINE 5 %
1 AEROSOL (ML) TOPICAL ONCE
Refills: 0 | Status: COMPLETED | OUTPATIENT
Start: 2023-11-20 | End: 2023-11-20

## 2023-11-20 RX ORDER — CELECOXIB 200 MG/1
400 CAPSULE ORAL ONCE
Refills: 0 | Status: DISCONTINUED | OUTPATIENT
Start: 2023-11-20 | End: 2023-11-20

## 2023-11-20 RX ORDER — CHLORHEXIDINE GLUCONATE 213 G/1000ML
1 SOLUTION TOPICAL ONCE
Refills: 0 | Status: COMPLETED | OUTPATIENT
Start: 2023-11-20 | End: 2023-11-20

## 2023-11-20 RX ORDER — CEFAZOLIN SODIUM 1 G
2000 VIAL (EA) INJECTION EVERY 8 HOURS
Refills: 0 | Status: COMPLETED | OUTPATIENT
Start: 2023-11-20 | End: 2023-11-21

## 2023-11-20 RX ORDER — ASPIRIN/CALCIUM CARB/MAGNESIUM 324 MG
81 TABLET ORAL EVERY 12 HOURS
Refills: 0 | Status: DISCONTINUED | OUTPATIENT
Start: 2023-11-21 | End: 2023-11-21

## 2023-11-20 RX ORDER — SODIUM CHLORIDE 9 MG/ML
1000 INJECTION, SOLUTION INTRAVENOUS
Refills: 0 | Status: DISCONTINUED | OUTPATIENT
Start: 2023-11-20 | End: 2023-11-21

## 2023-11-20 RX ORDER — APREPITANT 80 MG/1
40 CAPSULE ORAL ONCE
Refills: 0 | Status: DISCONTINUED | OUTPATIENT
Start: 2023-11-20 | End: 2023-11-20

## 2023-11-20 RX ADMIN — SENNA PLUS 2 TABLET(S): 8.6 TABLET ORAL at 22:09

## 2023-11-20 RX ADMIN — Medication 1000 MILLIGRAM(S): at 23:09

## 2023-11-20 RX ADMIN — Medication 15 MILLIGRAM(S): at 18:10

## 2023-11-20 RX ADMIN — Medication 1 APPLICATION(S): at 10:04

## 2023-11-20 RX ADMIN — Medication 1000 MILLIGRAM(S): at 22:09

## 2023-11-20 RX ADMIN — Medication 1000 MILLIGRAM(S): at 10:13

## 2023-11-20 RX ADMIN — POLYETHYLENE GLYCOL 3350 17 GRAM(S): 17 POWDER, FOR SOLUTION ORAL at 22:08

## 2023-11-20 RX ADMIN — CHLORHEXIDINE GLUCONATE 1 APPLICATION(S): 213 SOLUTION TOPICAL at 10:04

## 2023-11-20 NOTE — H&P ADULT - PROBLEM SELECTOR PLAN 1
Admit to Orthopaedic Service.  Presents today for elective removal of hardware left tibia  Pt medically stable and cleared for procedure today by Dr. Wilks

## 2023-11-20 NOTE — PATIENT PROFILE ADULT - FALL HARM RISK - HARM RISK INTERVENTIONS
Assistance with ambulation/Assistance OOB with selected safe patient handling equipment/Communicate Risk of Fall with Harm to all staff/Discuss with provider need for PT consult/Monitor gait and stability/Provide patient with walking aids - walker, cane, crutches/Reinforce activity limits and safety measures with patient and family/Sit up slowly, dangle for a short time, stand at bedside before walking/Tailored Fall Risk Interventions/Use of alarms - bed, chair and/or voice tab/Visual Cue: Yellow wristband and red socks/Bed in lowest position, wheels locked, appropriate side rails in place/Call bell, personal items and telephone in reach/Instruct patient to call for assistance before getting out of bed or chair/Non-slip footwear when patient is out of bed/[place] to call system/Physically safe environment - no spills, clutter or unnecessary equipment/Purposeful Proactive Rounding/Room/bathroom lighting operational, light cord in reach

## 2023-11-20 NOTE — PATIENT PROFILE ADULT - DO YOU FEEL LIKE HURTING YOURSELF OR OTHERS?
[facility] [place]  [address]  [place] [address]  Phone: [phone]    October 5, 2021        Mikey Jenkins  [address] [place] [address]          To whom it may concern:    RE: Mikey Jenkins    Patient was seen and treated today at our clinic.    Please contact me for questions or concerns.      Sincerely,        Anai Le MD  
no

## 2023-11-20 NOTE — PATIENT PROFILE ADULT - NSPROMEDSADMININFO_GEN_A_NUR
no concerns Z Plasty Text: The lesion was extirpated to the level of the fat with a #15 scalpel blade.  Given the location of the defect, shape of the defect and the proximity to free margins a Z-plasty was deemed most appropriate for repair.  Using a sterile surgical marker, the appropriate transposition arms of the Z-plasty were drawn incorporating the defect and placing the expected incisions within the relaxed skin tension lines where possible.    The area thus outlined was incised deep to adipose tissue with a #15 scalpel blade.  The skin margins were undermined to an appropriate distance in all directions utilizing iris scissors.  The opposing transposition arms were then transposed into place in opposite direction and anchored with interrupted buried subcutaneous sutures.

## 2023-11-20 NOTE — H&P ADULT - NSHPPHYSICALEXAM_GEN_ALL_CORE
Gen: *** y/o, NAD  MSK: Decreased *** ROM secondary to pain  Skin without erythema, ecchymosis, abrasions or lesions, signs of infection  Calves soft, nontender bilaterally   Sensation intact to light touch bilateral *** extremities  Pulses: ***, brisk capillary refill  EHL/FHL/TA/GS 5/5 bilaterally   AIN/PIN/ulnar/median/radial intact bilaterally    Rest of PE per MD clearance Gen: 64 y/o, NAD  MSK: Decreased left knee ROM secondary to pain  Well healed suprapatella nailing incision left knee- Skin without erythema, ecchymosis, abrasions, signs of infection  Calves soft, nontender bilaterally   Sensation intact to light touch bilateral lower extremities  Pulses: 2+ DP bilaterally, brisk capillary refill  EHL/FHL/TA/GS 5/5 bilaterally     Rest of PE per MD clearance

## 2023-11-20 NOTE — H&P ADULT - NSHPLABSRESULTS_GEN_ALL_CORE
Preop CBC, BMP, PT/INR, PTT within normal range and reviewed per medical clearance  Cr- 0.9  UA: within normal limits and reviewed per medical clearance  Preop CXR within normal limits and reviewed per medical clearance   Preop EKG within normal limits and reviewed per medical clearance  3M: DOS Preop CBC, BMP, PT/INR, PTT within normal range and reviewed per medical clearance  Hgb: 13.4  Cr- 0.9  UA: within normal limits and reviewed per medical clearance  Preop CXR with biapical nodular pleural thickening thought to be post inflammatory and chronic- reviewed per medical clearance   Preop EKG Sinus rhythm HR 60bpm, reviewed per medical clearance  3M: DOS

## 2023-11-21 ENCOUNTER — TRANSCRIPTION ENCOUNTER (OUTPATIENT)
Age: 63
End: 2023-11-21

## 2023-11-21 VITALS
DIASTOLIC BLOOD PRESSURE: 78 MMHG | OXYGEN SATURATION: 97 % | HEART RATE: 76 BPM | TEMPERATURE: 98 F | SYSTOLIC BLOOD PRESSURE: 148 MMHG | RESPIRATION RATE: 16 BRPM

## 2023-11-21 LAB
ANION GAP SERPL CALC-SCNC: 12 MMOL/L — SIGNIFICANT CHANGE UP (ref 5–17)
ANION GAP SERPL CALC-SCNC: 12 MMOL/L — SIGNIFICANT CHANGE UP (ref 5–17)
BUN SERPL-MCNC: 16 MG/DL — SIGNIFICANT CHANGE UP (ref 7–23)
BUN SERPL-MCNC: 16 MG/DL — SIGNIFICANT CHANGE UP (ref 7–23)
CALCIUM SERPL-MCNC: 9.3 MG/DL — SIGNIFICANT CHANGE UP (ref 8.4–10.5)
CALCIUM SERPL-MCNC: 9.3 MG/DL — SIGNIFICANT CHANGE UP (ref 8.4–10.5)
CHLORIDE SERPL-SCNC: 102 MMOL/L — SIGNIFICANT CHANGE UP (ref 96–108)
CHLORIDE SERPL-SCNC: 102 MMOL/L — SIGNIFICANT CHANGE UP (ref 96–108)
CO2 SERPL-SCNC: 26 MMOL/L — SIGNIFICANT CHANGE UP (ref 22–31)
CO2 SERPL-SCNC: 26 MMOL/L — SIGNIFICANT CHANGE UP (ref 22–31)
CREAT SERPL-MCNC: 0.81 MG/DL — SIGNIFICANT CHANGE UP (ref 0.5–1.3)
CREAT SERPL-MCNC: 0.81 MG/DL — SIGNIFICANT CHANGE UP (ref 0.5–1.3)
EGFR: 100 ML/MIN/1.73M2 — SIGNIFICANT CHANGE UP
EGFR: 100 ML/MIN/1.73M2 — SIGNIFICANT CHANGE UP
GLUCOSE SERPL-MCNC: 95 MG/DL — SIGNIFICANT CHANGE UP (ref 70–99)
GLUCOSE SERPL-MCNC: 95 MG/DL — SIGNIFICANT CHANGE UP (ref 70–99)
HCT VFR BLD CALC: 39.7 % — SIGNIFICANT CHANGE UP (ref 39–50)
HCT VFR BLD CALC: 39.7 % — SIGNIFICANT CHANGE UP (ref 39–50)
HGB BLD-MCNC: 13.2 G/DL — SIGNIFICANT CHANGE UP (ref 13–17)
HGB BLD-MCNC: 13.2 G/DL — SIGNIFICANT CHANGE UP (ref 13–17)
MCHC RBC-ENTMCNC: 29.9 PG — SIGNIFICANT CHANGE UP (ref 27–34)
MCHC RBC-ENTMCNC: 29.9 PG — SIGNIFICANT CHANGE UP (ref 27–34)
MCHC RBC-ENTMCNC: 33.2 GM/DL — SIGNIFICANT CHANGE UP (ref 32–36)
MCHC RBC-ENTMCNC: 33.2 GM/DL — SIGNIFICANT CHANGE UP (ref 32–36)
MCV RBC AUTO: 90 FL — SIGNIFICANT CHANGE UP (ref 80–100)
MCV RBC AUTO: 90 FL — SIGNIFICANT CHANGE UP (ref 80–100)
NRBC # BLD: 0 /100 WBCS — SIGNIFICANT CHANGE UP (ref 0–0)
NRBC # BLD: 0 /100 WBCS — SIGNIFICANT CHANGE UP (ref 0–0)
PLATELET # BLD AUTO: 220 K/UL — SIGNIFICANT CHANGE UP (ref 150–400)
PLATELET # BLD AUTO: 220 K/UL — SIGNIFICANT CHANGE UP (ref 150–400)
POTASSIUM SERPL-MCNC: 3.9 MMOL/L — SIGNIFICANT CHANGE UP (ref 3.5–5.3)
POTASSIUM SERPL-MCNC: 3.9 MMOL/L — SIGNIFICANT CHANGE UP (ref 3.5–5.3)
POTASSIUM SERPL-SCNC: 3.9 MMOL/L — SIGNIFICANT CHANGE UP (ref 3.5–5.3)
POTASSIUM SERPL-SCNC: 3.9 MMOL/L — SIGNIFICANT CHANGE UP (ref 3.5–5.3)
RBC # BLD: 4.41 M/UL — SIGNIFICANT CHANGE UP (ref 4.2–5.8)
RBC # BLD: 4.41 M/UL — SIGNIFICANT CHANGE UP (ref 4.2–5.8)
RBC # FLD: 12.5 % — SIGNIFICANT CHANGE UP (ref 10.3–14.5)
RBC # FLD: 12.5 % — SIGNIFICANT CHANGE UP (ref 10.3–14.5)
SODIUM SERPL-SCNC: 140 MMOL/L — SIGNIFICANT CHANGE UP (ref 135–145)
SODIUM SERPL-SCNC: 140 MMOL/L — SIGNIFICANT CHANGE UP (ref 135–145)
WBC # BLD: 13.43 K/UL — HIGH (ref 3.8–10.5)
WBC # BLD: 13.43 K/UL — HIGH (ref 3.8–10.5)
WBC # FLD AUTO: 13.43 K/UL — HIGH (ref 3.8–10.5)
WBC # FLD AUTO: 13.43 K/UL — HIGH (ref 3.8–10.5)

## 2023-11-21 PROCEDURE — 36415 COLL VENOUS BLD VENIPUNCTURE: CPT

## 2023-11-21 PROCEDURE — 97166 OT EVAL MOD COMPLEX 45 MIN: CPT

## 2023-11-21 PROCEDURE — 99221 1ST HOSP IP/OBS SF/LOW 40: CPT

## 2023-11-21 PROCEDURE — 73560 X-RAY EXAM OF KNEE 1 OR 2: CPT

## 2023-11-21 PROCEDURE — 97161 PT EVAL LOW COMPLEX 20 MIN: CPT

## 2023-11-21 PROCEDURE — 76000 FLUOROSCOPY <1 HR PHYS/QHP: CPT

## 2023-11-21 PROCEDURE — 80048 BASIC METABOLIC PNL TOTAL CA: CPT

## 2023-11-21 PROCEDURE — 85027 COMPLETE CBC AUTOMATED: CPT

## 2023-11-21 PROCEDURE — 82962 GLUCOSE BLOOD TEST: CPT

## 2023-11-21 PROCEDURE — 73590 X-RAY EXAM OF LOWER LEG: CPT

## 2023-11-21 RX ADMIN — PANTOPRAZOLE SODIUM 40 MILLIGRAM(S): 20 TABLET, DELAYED RELEASE ORAL at 06:09

## 2023-11-21 RX ADMIN — Medication 1000 MILLIGRAM(S): at 06:09

## 2023-11-21 RX ADMIN — Medication 15 MILLIGRAM(S): at 06:08

## 2023-11-21 RX ADMIN — Medication 1000 MILLIGRAM(S): at 13:38

## 2023-11-21 RX ADMIN — Medication 81 MILLIGRAM(S): at 06:09

## 2023-11-21 RX ADMIN — Medication 100 MILLIGRAM(S): at 07:43

## 2023-11-21 RX ADMIN — Medication 1 TABLET(S): at 13:38

## 2023-11-21 RX ADMIN — Medication 100 MILLIGRAM(S): at 02:53

## 2023-11-21 RX ADMIN — Medication 15 MILLIGRAM(S): at 07:08

## 2023-11-21 NOTE — PROGRESS NOTE ADULT - SUBJECTIVE AND OBJECTIVE BOX
Ortho Note    Pt comfortable without complaints, pain controlled  Denies CP, SOB, N/V, new numbness/tingling     Vital Signs Last 24 Hrs  T(C): 36.6 (11-21-23 @ 05:00), Max: 36.6 (11-21-23 @ 05:00)  T(F): 97.8 (11-21-23 @ 05:00), Max: 97.8 (11-21-23 @ 05:00)  HR: 63 (11-21-23 @ 05:00) (63 - 63)  BP: 139/73 (11-21-23 @ 05:00) (139/73 - 139/73)  BP(mean): --  RR: 16 (11-21-23 @ 05:00) (16 - 16)  SpO2: 96% (11-21-23 @ 05:00) (96% - 96%)  I&O's Summary    20 Nov 2023 07:01  -  21 Nov 2023 07:00  --------------------------------------------------------  IN: 2200 mL / OUT: 1600 mL / NET: 600 mL        General: Pt Alert and oriented, NAD  DSG C/D/I- ace wrap, prineo tege xerforom  B/L LE: sensation intact, DP 2+, brisk cap refill, EHL/FHL/TA/GS 5/5          A/P: 63yMale s/p L tib BUTCH IMN 11/20  - Stable  - Pain Control  -Abx ancef  - DVT ppx: asa 81 bid  - PT, WBS: wbat lle  - Dispo: pend PT eval. Plan is for dc today    Ortho Pager [phone]

## 2023-11-21 NOTE — CONSULT NOTE ADULT - ASSESSMENT
63M w pre-DM, R TKR w Dr. Sosa 4/2023, h/o L tibia IMN 12/2022, walks w cane at baseline returns for elective LEFT Tibial hardware removal w Dr. Sosa 11/20/2023    #Post-op state - pain __. PPx: ASA 81 bid. On bowel regimen and incentive spirometer  #L knee OA   - tylenol 1g q8, toradol 4 doses   - Oxycodone PRN 5/10 q4h for mod/severe pain    #Pre-DM - recent A1C 5.5 - now out of pre-DM range  Hgb 13.4. Cr 0.9 baseline    Plan  PT; f/u Labs 63M w pre-DM, R TKR w Dr. Sosa 4/2023, h/o L tibia IMN 12/2022, walks w cane at baseline returns for elective LEFT Tibial hardware removal w Dr. Sosa 11/20/2023    #Post-op state - pain controlled. PPx: ASA 81 bid. On bowel regimen and incentive spirometer  #L knee OA   - tylenol 1g q8, toradol 4 doses   - Oxycodone PRN 5/10 q4h for mod/severe pain    #Pre-DM - recent A1C 5.5 - now out of pre-DM range  #Leukocytosis - Likely reactive as pt afebrile and non-toxic appearing    Plan  Optimized for disposition once pt clears PT  Discussed above w Orthopedics

## 2023-11-21 NOTE — DISCHARGE NOTE PROVIDER - NSDCCPCAREPLAN_GEN_ALL_CORE_FT
PRINCIPAL DISCHARGE DIAGNOSIS  Diagnosis: Pain of knee and lower leg  Assessment and Plan of Treatment: improvement s/p Removal of hardware

## 2023-11-21 NOTE — CONSULT NOTE ADULT - SUBJECTIVE AND OBJECTIVE BOX
HPI "62yo M with left knee pain x 11 months. Pt with hx of left tibia IMN 12/2022 after fx from a fall. Denies numbness or tingling into bilateral lower extremities. Ambulates with a cane. Pt takes ibuprofen 600mg oral as needed for the pain. He has tried physical therapy, injection (cortisone July '23)medications, rest and time without relief of symptoms. He has hx of right TKA with Dr. Sosa 4/2023 and states he has OA in left knee as well that will require TKA after BUTCH.     Denies hx of DM, stroke, MI, seizures, blood clots, denies taking blood thinners.     Presents today for elective left tibia hardware removal (20 Nov 2023 09:51)"    63M w pre-DM, R TKR w Dr. Sosa 4/2023, h/o L tibia IMN 12/2022, walks w cane at baseline returns for elective LEFT Tibial hardware removal w Dr. Sosa 11/20/2023    #Post-op state - pain __. PPx: ASA 81 bid. On bowel regimen and incentive spirometer  #L knee OA   - tylenol 1g q8, toradol 4 doses   - Oxycodone PRN 5/10 q4h for mod/severe pain    #Pre-DM - recent A1C 5.5 - now out of pre-DM range  Hgb 13.4. Cr 0.9 baseline    Plan  PT; f/u Labs    PAST MEDICAL & SURGICAL HISTORY:  Osteoarthritis      Prediabetes      H/O knee surgery  LEFT KNEE FRACTURE      H/O fracture of tibia  left fx repair with hardwaare        Home Meds: Home Medications:  acetaminophen 325 mg oral tablet: 3 tab(s) orally every 8 hours (20 Nov 2023 09:56)  ibuprofen 600 mg oral tablet: 1 tab(s) orally as needed for  mild pain (20 Nov 2023 09:56)  vitamin D3  daily:  (20 Nov 2023 09:56)    Allergies: Allergies    No Known Allergies    Intolerances      Soc:   Advanced Directives: Presumed Full Code     CURRENT MEDICATIONS:   --------------------------------------------------------------------------------------  Neurologic Medications  acetaminophen     Tablet .. 1000 milliGRAM(s) Oral every 8 hours  HYDROmorphone  Injectable 0.5 milliGRAM(s) IV Push every 4 hours PRN Breakthrough pain floors only  HYDROmorphone  Injectable 0.5 milliGRAM(s) IV Push every 15 minutes PRN breakthrough pain pacu only  ketorolac   Injectable 15 milliGRAM(s) IV Push every 6 hours  ondansetron Injectable 4 milliGRAM(s) IV Push every 4 hours PRN Nausea and/or Vomiting  oxyCODONE    IR 10 milliGRAM(s) Oral every 4 hours PRN Severe Pain (7 - 10)  oxyCODONE    IR 5 milliGRAM(s) Oral every 4 hours PRN Moderate Pain (4 - 6)    Respiratory Medications    Cardiovascular Medications    Gastrointestinal Medications  lactated ringers. 1000 milliLiter(s) IV Continuous <Continuous>  magnesium hydroxide Suspension 30 milliLiter(s) Oral daily PRN Constipation  multivitamin 1 Tablet(s) Oral daily  pantoprazole    Tablet 40 milliGRAM(s) Oral before breakfast  polyethylene glycol 3350 17 Gram(s) Oral at bedtime  senna 2 Tablet(s) Oral at bedtime    Genitourinary Medications    Hematologic/Oncologic Medications  aspirin enteric coated 81 milliGRAM(s) Oral every 12 hours    Antimicrobial/Immunologic Medications    Endocrine/Metabolic Medications    Topical/Other Medications    --------------------------------------------------------------------------------------    VITAL SIGNS, INS/OUTS (last 24 hours):  --------------------------------------------------------------------------------------  ICU Vital Signs Last 24 Hrs  T(C): 36.6 (21 Nov 2023 05:00), Max: 36.7 (20 Nov 2023 18:32)  T(F): 97.8 (21 Nov 2023 05:00), Max: 98.1 (20 Nov 2023 18:32)  HR: 63 (21 Nov 2023 05:00) (58 - 87)  BP: 139/73 (21 Nov 2023 05:00) (109/59 - 144/76)  BP(mean): 90 (20 Nov 2023 17:02) (81 - 96)  ABP: --  ABP(mean): --  RR: 16 (21 Nov 2023 05:00) (11 - 21)  SpO2: 96% (21 Nov 2023 05:00) (94% - 98%)    O2 Parameters below as of 21 Nov 2023 05:00  Patient On (Oxygen Delivery Method): room air          I&O's Summary    20 Nov 2023 07:01  -  21 Nov 2023 07:00  --------------------------------------------------------  IN: 2200 mL / OUT: 1600 mL / NET: 600 mL      --------------------------------------------------------------------------------------    EXAM:      LABS  --------------------------------------------------------------------------------------  Labs:  CAPILLARY BLOOD GLUCOSE      POCT Blood Glucose.: 88 mg/dL (20 Nov 2023 09:45)                  LFTs:         Coags:                  --------------------------------------------------------------------------------------    OTHER LABS    IMAGING RESULTS  ****************     HPI "62yo M with left knee pain x 11 months. Pt with hx of left tibia IMN 12/2022 after fx from a fall. Denies numbness or tingling into bilateral lower extremities. Ambulates with a cane. Pt takes ibuprofen 600mg oral as needed for the pain. He has tried physical therapy, injection (cortisone July '23)medications, rest and time without relief of symptoms. He has hx of right TKA with Dr. Sosa 4/2023 and states he has OA in left knee as well that will require TKA after BUTCH.     Denies hx of DM, stroke, MI, seizures, blood clots, denies taking blood thinners.     Presents today for elective left tibia hardware removal (20 Nov 2023 09:51)"    63M w pre-DM, R TKR w Dr. Sosa 4/2023, h/o L tibia IMN 12/2022, walks w cane at baseline returns for elective LEFT Tibial hardware removal w Dr. Sosa 11/20/2023    Doing well overall - no LH/dizziness, chest pain, dyspnea. Walking down hallway. No fever. Eating wo N/V/Abd pain. Voiding, passing flatus.     ROS: 12 point ROS reviewed and otherwise negative per HPI  FH: No DVT/PE   SH: Non smoker      PAST MEDICAL & SURGICAL HISTORY:  Osteoarthritis      Prediabetes      H/O knee surgery  LEFT KNEE FRACTURE      H/O fracture of tibia  left fx repair with hardwaare        Home Meds: Home Medications:  acetaminophen 325 mg oral tablet: 3 tab(s) orally every 8 hours (20 Nov 2023 09:56)  ibuprofen 600 mg oral tablet: 1 tab(s) orally as needed for  mild pain (20 Nov 2023 09:56)  vitamin D3  daily:  (20 Nov 2023 09:56)    Allergies: Allergies    No Known Allergies    Intolerances      Soc:   Advanced Directives: Presumed Full Code     CURRENT MEDICATIONS:   --------------------------------------------------------------------------------------  Neurologic Medications  acetaminophen     Tablet .. 1000 milliGRAM(s) Oral every 8 hours  HYDROmorphone  Injectable 0.5 milliGRAM(s) IV Push every 4 hours PRN Breakthrough pain floors only  HYDROmorphone  Injectable 0.5 milliGRAM(s) IV Push every 15 minutes PRN breakthrough pain pacu only  ketorolac   Injectable 15 milliGRAM(s) IV Push every 6 hours  ondansetron Injectable 4 milliGRAM(s) IV Push every 4 hours PRN Nausea and/or Vomiting  oxyCODONE    IR 10 milliGRAM(s) Oral every 4 hours PRN Severe Pain (7 - 10)  oxyCODONE    IR 5 milliGRAM(s) Oral every 4 hours PRN Moderate Pain (4 - 6)    Respiratory Medications    Cardiovascular Medications    Gastrointestinal Medications  lactated ringers. 1000 milliLiter(s) IV Continuous <Continuous>  magnesium hydroxide Suspension 30 milliLiter(s) Oral daily PRN Constipation  multivitamin 1 Tablet(s) Oral daily  pantoprazole    Tablet 40 milliGRAM(s) Oral before breakfast  polyethylene glycol 3350 17 Gram(s) Oral at bedtime  senna 2 Tablet(s) Oral at bedtime    Genitourinary Medications    Hematologic/Oncologic Medications  aspirin enteric coated 81 milliGRAM(s) Oral every 12 hours    Antimicrobial/Immunologic Medications    Endocrine/Metabolic Medications    Topical/Other Medications    --------------------------------------------------------------------------------------    VITAL SIGNS, INS/OUTS (last 24 hours):  --------------------------------------------------------------------------------------  ICU Vital Signs Last 24 Hrs  T(C): 36.6 (21 Nov 2023 05:00), Max: 36.7 (20 Nov 2023 18:32)  T(F): 97.8 (21 Nov 2023 05:00), Max: 98.1 (20 Nov 2023 18:32)  HR: 63 (21 Nov 2023 05:00) (58 - 87)  BP: 139/73 (21 Nov 2023 05:00) (109/59 - 144/76)  BP(mean): 90 (20 Nov 2023 17:02) (81 - 96)  ABP: --  ABP(mean): --  RR: 16 (21 Nov 2023 05:00) (11 - 21)  SpO2: 96% (21 Nov 2023 05:00) (94% - 98%)    O2 Parameters below as of 21 Nov 2023 05:00  Patient On (Oxygen Delivery Method): room air          I&O's Summary    20 Nov 2023 07:01  -  21 Nov 2023 07:00  --------------------------------------------------------  IN: 2200 mL / OUT: 1600 mL / NET: 600 mL      --------------------------------------------------------------------------------------    EXAM:  GEN: Male in NAD on RA  HEENT: NC/AT, MMM  CV: RRR, nml S1S2, no murmurs  PULM: nml effort, CTAB  ABD: Soft, non-distended, NABS, non-tender  NEURO  A/O x3, moving all extremities, L knee dressing c/d/i. 5/5 in plantarflex/ext b/l. Sensation intact  PSYCH: Appropriate      LABS  --------------------------------------------------------------------------------------  Labs:  CAPILLARY BLOOD GLUCOSE      POCT Blood Glucose.: 88 mg/dL (20 Nov 2023 09:45)                  LFTs:         Coags:                  --------------------------------------------------------------------------------------    OTHER LABS    IMAGING RESULTS  ****************

## 2023-11-21 NOTE — PHYSICAL THERAPY INITIAL EVALUATION ADULT - PERTINENT HX OF CURRENT PROBLEM, REHAB EVAL
62yo M with left knee pain x 11 months. Pt with hx of left tibia IMN 12/2022 after fx from a fall. Denies numbness or tingling into bilateral lower extremities. Ambulates with a cane. Pt takes ibuprofen 600mg oral as needed for the pain. He has tried physical therapy, injection (cortisone July '23)medications, rest and time without relief of symptoms. He has hx of right TKA with Dr. Sosa 4/2023 and states he has OA in left knee as well that will require TKA after BUTCH.

## 2023-11-21 NOTE — OCCUPATIONAL THERAPY INITIAL EVALUATION ADULT - ADDITIONAL COMMENTS
Pt lives alone in an apartment with 0STE. Pt reports that prior to admission, pt was independent in all ADLs and IADLs, and ambulates with straight cane. Pt has a bathtub shower with no DME.

## 2023-11-21 NOTE — DISCHARGE NOTE PROVIDER - HOSPITAL COURSE
Admitted 11/20  Surgery 11/20 BUTCH Left LE IM Nail  Eliana-op Antibiotics  Pain control  DVT prophylaxis  OOB/Physical Therapy Admitted 11/20  Surgery 11/20 BUTCH Left tibia IM Nail  Eliana-op Antibiotics  Pain control  DVT prophylaxis  OOB/Physical Therapy

## 2023-11-21 NOTE — DISCHARGE NOTE PROVIDER - NSDCMRMEDTOKEN_GEN_ALL_CORE_FT
acetaminophen 325 mg oral tablet: 3 tab(s) orally every 8 hours  ibuprofen 600 mg oral tablet: 1 tab(s) orally as needed for  mild pain  vitamin D3  daily:    acetaminophen 325 mg oral tablet: 3 tab(s) orally every 8 hours as needed for  mild pain  ibuprofen 600 mg oral tablet: 1 tab(s) orally 3 times a day as needed for  severe pain  vitamin D3  daily:    acetaminophen 325 mg oral tablet: 3 tab(s) orally every 8 hours as needed for  mild pain  ibuprofen 600 mg oral tablet: 1 tab(s) orally 3 times a day as needed for  severe pain  Outpatient PT: 3 times a week for 6 weeks Dx: tibia fracture s/p removal of IM Nail  vitamin D3  daily:

## 2023-11-21 NOTE — OCCUPATIONAL THERAPY INITIAL EVALUATION ADULT - GENERAL OBSERVATIONS, REHAB EVAL
OT IE Completed. Pt's ISMAEL Mendoza cleared pt for therapy. Pt received semisupine in bed, +tele, +heplock, +L knee ace wrap C/D/I, room air, NAD, agreeable to OT. Pt tolerated session well. Pt left as found, all lines in tact, needs in reach. ISMAEL Mendoza aware.

## 2023-11-21 NOTE — DISCHARGE NOTE PROVIDER - CARE PROVIDER_API CALL
Nickolas oSsa  [facility]  [address]  [place] [address]  Phone: [phone]  Fax: [phone]  Follow Up Time: 2 weeks

## 2023-11-21 NOTE — OCCUPATIONAL THERAPY INITIAL EVALUATION ADULT - NSACTIVITYREC_GEN_A_OT
As pt is independent with ADLs, functional transfers, and functional mobility with straight cane, will d/c pt from inpatient OT services at this time. Recommend home with no skilled OT services and family support as needed.

## 2023-11-21 NOTE — DISCHARGE NOTE NURSING/CASE MANAGEMENT/SOCIAL WORK - NSDCPEFALRISK_GEN_ALL_CORE
For information on Fall & Injury Prevention, visit: [email or web address] OR  [email or web address] OR  https://www.cdc.gov/steadi/patient.html

## 2023-11-21 NOTE — PHYSICAL THERAPY INITIAL EVALUATION ADULT - ADDITIONAL COMMENTS
Patient lives alone in an apartment with 2 steps to enter building with 1 rail. Denies any other steps. Ambulates with a cane and owns a RW

## 2023-11-21 NOTE — DISCHARGE NOTE NURSING/CASE MANAGEMENT/SOCIAL WORK - PATIENT PORTAL LINK FT
You can access the FollowMyHealth Patient Portal offered by [facility] by registering at the following website: [email or web address]. By joining [facility]’s FollowMyHealth portal, you will also be able to view your health information using other applications (apps) compatible with our system.

## 2023-11-21 NOTE — DISCHARGE NOTE PROVIDER - NSDCCPTREATMENT_GEN_ALL_CORE_FT
PRINCIPAL PROCEDURE  Procedure: Removal of deeply implanted hardware  Findings and Treatment: left leg     PRINCIPAL PROCEDURE  Procedure: Removal of deeply implanted hardware  Findings and Treatment: left tibia IM Nail

## 2023-11-21 NOTE — PHYSICAL THERAPY INITIAL EVALUATION ADULT - GENERAL OBSERVATIONS, REHAB EVAL
As per ISMAEL aguiar patient cleared for PT/OOB. Received supine + SCDs, hepl;ock, ace wrap left leg C,D,I, in NAD

## 2023-11-21 NOTE — DISCHARGE NOTE PROVIDER - NSDCFUADDINST_GEN_ALL_CORE_FT
ACTIVITY:   - Weight bear as tolerated with assistive device. No strenuous activity, heavy lifting, driving or returning to work until cleared by MD.   - Apply a cold compress to the surgical site several times daily to reduce pain and swelling. For icing, twenty-minute sessions followed by an hour off is recommended. You should ice as frequently as possible. Ice should NEVER be placed directly on the skin. Wearing compression stockings during the first week after surgery can help reduce swelling in your knee, calf and foot, but is not required.     DRESSING/SHOWERING:   (Gauze/tegaderm)   - You may remove ace bandage on wednesday and shower, your dressing is water-resistant. Do not soak in bathtubs. Remove dressing after postop day 7, then leave incision open to air. You may do this yourself (simply peel it off), or you may ask for assistance from your visiting nurse. Keep your incision clean and dry. Do not pick at your incision. Do not apply creams, ointments or oils to your incision until cleared by your surgeon. Do not soak your incision in sitting water (ie tubs, pools, lakes, etc.) until cleared by your surgeon. Do not scrub the incision – instead, allow soap and water to flow over the incision and then pat it dry with a clean towel.       MEDICATION/ANTICOAGULATION:   - You have been prescribed medications for pain:     - Tylenol for mild to moderate pain. Do not exceed 3,000mg daily.   - For any additional medications, follow instructions on the bottle.    -Try to have regular bowel movements. Take stool softener or laxative if necessary. You may wish to take Miralax daily until you have regular bowel movements.    - If you have been prescribed Aspirin or an anti-inflammatory, please take prilosec (omeprazole) once a day, before breakfast, until no longer taking Aspirin or anti-inflammatory. This will help protect your stomach.   - If you have a pain management physician, please follow-up with them postoperatively.    - If you experience any negative side effects of your medications, please call your surgeon's office to discuss.      FOLLOW-UP:   - Call to schedule an appt with Dr. Sosa for follow up.   - Please follow-up with your primary care physician or any other specialist you see postoperatively, if needed.    - Contact your doctor or go to the emergency room if you experience: fever greater than 101.5, chills, chest pain, difficulty breathing, redness or excessive drainage around the incision, other concerns.  ACTIVITY:   - Weight bear as tolerated with assistive device. No strenuous activity, heavy lifting, driving or returning to work until cleared by MD.   - Apply a cold compress to the surgical site several times daily to reduce pain and swelling. For icing, twenty-minute sessions followed by an hour off is recommended. You should ice as frequently as possible. Ice should NEVER be placed directly on the skin. Wearing compression stockings during the first week after surgery can help reduce swelling in your knee, calf and foot, but is not required.     DRESSING/SHOWERING:   (Gauze/tegaderm)   - You may remove ace bandage on wednesday and shower, your dressing is water-resistant. Do not soak in bathtubs. Remove dressing after postop day 7, then leave incision open to air. You may do this yourself (simply peel it off), or you may ask for assistance from your visiting nurse. Keep your incision clean and dry. Do not pick at your incision. Do not apply creams, ointments or oils to your incision until cleared by your surgeon. Do not soak your incision in sitting water (ie tubs, pools, lakes, etc.) until cleared by your surgeon. Do not scrub the incision – instead, allow soap and water to flow over the incision and then pat it dry with a clean towel.       MEDICATION/ANTICOAGULATION:   - You have been prescribed medications for pain:     - Tylenol for mild to moderate pain. Do not exceed 3,000mg daily.   - For any additional medications, follow instructions on the bottle.  -Try to have regular bowel movements. Take stool softener or laxative if necessary. You may wish to take Miralax daily until you have regular bowel movements.    - If you have a pain management physician, please follow-up with them postoperatively.    - If you experience any negative side effects of your medications, please call your surgeon's office to discuss.      FOLLOW-UP:   - Call to schedule an appt with Dr. Sosa for follow up.   - Please follow-up with your primary care physician or any other specialist you see postoperatively, if needed.    - Contact your doctor or go to the emergency room if you experience: fever greater than 101.5, chills, chest pain, difficulty breathing, redness or excessive drainage around the incision, other concerns.

## 2023-11-27 DIAGNOSIS — Z96.651 PRESENCE OF RIGHT ARTIFICIAL KNEE JOINT: ICD-10-CM

## 2023-11-27 DIAGNOSIS — R73.03 PREDIABETES: ICD-10-CM
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
## 2023-11-27 DIAGNOSIS — D72.828 OTHER ELEVATED WHITE BLOOD CELL COUNT: ICD-10-CM

## 2023-11-27 DIAGNOSIS — S82.202D UNSPECIFIED FRACTURE OF SHAFT OF LEFT TIBIA, SUBSEQUENT ENCOUNTER FOR CLOSED FRACTURE WITH ROUTINE HEALING: ICD-10-CM

## 2023-11-27 DIAGNOSIS — M17.12 UNILATERAL PRIMARY OSTEOARTHRITIS, LEFT KNEE: ICD-10-CM

## 2023-11-30 ENCOUNTER — EMERGENCY (EMERGENCY)
Facility: HOSPITAL | Age: 63
LOS: 1 days | Discharge: HOME | End: 2023-11-30
Attending: EMERGENCY MEDICINE | Admitting: EMERGENCY MEDICINE
Payer: MEDICAID

## 2023-11-30 VITALS
TEMPERATURE: 98 F | OXYGEN SATURATION: 100 % | SYSTOLIC BLOOD PRESSURE: 189 MMHG | DIASTOLIC BLOOD PRESSURE: 89 MMHG | RESPIRATION RATE: 20 BRPM | HEIGHT: 67 IN | WEIGHT: 190.04 LBS | HEART RATE: 110 BPM

## 2023-11-30 VITALS
DIASTOLIC BLOOD PRESSURE: 87 MMHG | TEMPERATURE: 98 F | HEART RATE: 79 BPM | SYSTOLIC BLOOD PRESSURE: 147 MMHG | RESPIRATION RATE: 16 BRPM | OXYGEN SATURATION: 98 %

## 2023-11-30 DIAGNOSIS — L76.82 OTHER POSTPROCEDURAL COMPLICATIONS OF SKIN AND SUBCUTANEOUS TISSUE: ICD-10-CM
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
## 2023-11-30 DIAGNOSIS — Z87.81 PERSONAL HISTORY OF (HEALED) TRAUMATIC FRACTURE: Chronic | ICD-10-CM

## 2023-11-30 DIAGNOSIS — Z98.890 OTHER SPECIFIED POSTPROCEDURAL STATES: Chronic | ICD-10-CM

## 2023-11-30 LAB
ANION GAP SERPL CALC-SCNC: 9 MMOL/L — SIGNIFICANT CHANGE UP (ref 5–17)
ANION GAP SERPL CALC-SCNC: 9 MMOL/L — SIGNIFICANT CHANGE UP (ref 5–17)
BASOPHILS # BLD AUTO: 0.03 K/UL — SIGNIFICANT CHANGE UP (ref 0–0.2)
BASOPHILS # BLD AUTO: 0.03 K/UL — SIGNIFICANT CHANGE UP (ref 0–0.2)
BASOPHILS NFR BLD AUTO: 0.4 % — SIGNIFICANT CHANGE UP (ref 0–2)
BASOPHILS NFR BLD AUTO: 0.4 % — SIGNIFICANT CHANGE UP (ref 0–2)
BUN SERPL-MCNC: 27 MG/DL — HIGH (ref 7–23)
BUN SERPL-MCNC: 27 MG/DL — HIGH (ref 7–23)
CALCIUM SERPL-MCNC: 9.2 MG/DL — SIGNIFICANT CHANGE UP (ref 8.4–10.5)
CALCIUM SERPL-MCNC: 9.2 MG/DL — SIGNIFICANT CHANGE UP (ref 8.4–10.5)
CHLORIDE SERPL-SCNC: 99 MMOL/L — SIGNIFICANT CHANGE UP (ref 96–108)
CHLORIDE SERPL-SCNC: 99 MMOL/L — SIGNIFICANT CHANGE UP (ref 96–108)
CO2 SERPL-SCNC: 27 MMOL/L — SIGNIFICANT CHANGE UP (ref 22–31)
CO2 SERPL-SCNC: 27 MMOL/L — SIGNIFICANT CHANGE UP (ref 22–31)
CREAT SERPL-MCNC: 1.04 MG/DL — SIGNIFICANT CHANGE UP (ref 0.5–1.3)
CREAT SERPL-MCNC: 1.04 MG/DL — SIGNIFICANT CHANGE UP (ref 0.5–1.3)
EGFR: 81 ML/MIN/1.73M2 — SIGNIFICANT CHANGE UP
EGFR: 81 ML/MIN/1.73M2 — SIGNIFICANT CHANGE UP
EOSINOPHIL # BLD AUTO: 0.18 K/UL — SIGNIFICANT CHANGE UP (ref 0–0.5)
EOSINOPHIL # BLD AUTO: 0.18 K/UL — SIGNIFICANT CHANGE UP (ref 0–0.5)
EOSINOPHIL NFR BLD AUTO: 2.2 % — SIGNIFICANT CHANGE UP (ref 0–6)
EOSINOPHIL NFR BLD AUTO: 2.2 % — SIGNIFICANT CHANGE UP (ref 0–6)
GLUCOSE SERPL-MCNC: 99 MG/DL — SIGNIFICANT CHANGE UP (ref 70–99)
GLUCOSE SERPL-MCNC: 99 MG/DL — SIGNIFICANT CHANGE UP (ref 70–99)
HCT VFR BLD CALC: 37.8 % — LOW (ref 39–50)
HCT VFR BLD CALC: 37.8 % — LOW (ref 39–50)
HGB BLD-MCNC: 12.8 G/DL — LOW (ref 13–17)
HGB BLD-MCNC: 12.8 G/DL — LOW (ref 13–17)
IMM GRANULOCYTES NFR BLD AUTO: 0.4 % — SIGNIFICANT CHANGE UP (ref 0–0.9)
IMM GRANULOCYTES NFR BLD AUTO: 0.4 % — SIGNIFICANT CHANGE UP (ref 0–0.9)
LYMPHOCYTES # BLD AUTO: 1.68 K/UL — SIGNIFICANT CHANGE UP (ref 1–3.3)
LYMPHOCYTES # BLD AUTO: 1.68 K/UL — SIGNIFICANT CHANGE UP (ref 1–3.3)
LYMPHOCYTES # BLD AUTO: 20.6 % — SIGNIFICANT CHANGE UP (ref 13–44)
LYMPHOCYTES # BLD AUTO: 20.6 % — SIGNIFICANT CHANGE UP (ref 13–44)
MCHC RBC-ENTMCNC: 30 PG — SIGNIFICANT CHANGE UP (ref 27–34)
MCHC RBC-ENTMCNC: 30 PG — SIGNIFICANT CHANGE UP (ref 27–34)
MCHC RBC-ENTMCNC: 33.9 GM/DL — SIGNIFICANT CHANGE UP (ref 32–36)
MCHC RBC-ENTMCNC: 33.9 GM/DL — SIGNIFICANT CHANGE UP (ref 32–36)
MCV RBC AUTO: 88.7 FL — SIGNIFICANT CHANGE UP (ref 80–100)
MCV RBC AUTO: 88.7 FL — SIGNIFICANT CHANGE UP (ref 80–100)
MONOCYTES # BLD AUTO: 0.66 K/UL — SIGNIFICANT CHANGE UP (ref 0–0.9)
MONOCYTES # BLD AUTO: 0.66 K/UL — SIGNIFICANT CHANGE UP (ref 0–0.9)
MONOCYTES NFR BLD AUTO: 8.1 % — SIGNIFICANT CHANGE UP (ref 2–14)
MONOCYTES NFR BLD AUTO: 8.1 % — SIGNIFICANT CHANGE UP (ref 2–14)
NEUTROPHILS # BLD AUTO: 5.56 K/UL — SIGNIFICANT CHANGE UP (ref 1.8–7.4)
NEUTROPHILS # BLD AUTO: 5.56 K/UL — SIGNIFICANT CHANGE UP (ref 1.8–7.4)
NEUTROPHILS NFR BLD AUTO: 68.3 % — SIGNIFICANT CHANGE UP (ref 43–77)
NEUTROPHILS NFR BLD AUTO: 68.3 % — SIGNIFICANT CHANGE UP (ref 43–77)
NRBC # BLD: 0 /100 WBCS — SIGNIFICANT CHANGE UP (ref 0–0)
NRBC # BLD: 0 /100 WBCS — SIGNIFICANT CHANGE UP (ref 0–0)
PLATELET # BLD AUTO: 226 K/UL — SIGNIFICANT CHANGE UP (ref 150–400)
PLATELET # BLD AUTO: 226 K/UL — SIGNIFICANT CHANGE UP (ref 150–400)
POTASSIUM SERPL-MCNC: 4.4 MMOL/L — SIGNIFICANT CHANGE UP (ref 3.5–5.3)
POTASSIUM SERPL-MCNC: 4.4 MMOL/L — SIGNIFICANT CHANGE UP (ref 3.5–5.3)
POTASSIUM SERPL-SCNC: 4.4 MMOL/L — SIGNIFICANT CHANGE UP (ref 3.5–5.3)
POTASSIUM SERPL-SCNC: 4.4 MMOL/L — SIGNIFICANT CHANGE UP (ref 3.5–5.3)
RBC # BLD: 4.26 M/UL — SIGNIFICANT CHANGE UP (ref 4.2–5.8)
RBC # BLD: 4.26 M/UL — SIGNIFICANT CHANGE UP (ref 4.2–5.8)
RBC # FLD: 12.5 % — SIGNIFICANT CHANGE UP (ref 10.3–14.5)
RBC # FLD: 12.5 % — SIGNIFICANT CHANGE UP (ref 10.3–14.5)
SODIUM SERPL-SCNC: 135 MMOL/L — SIGNIFICANT CHANGE UP (ref 135–145)
SODIUM SERPL-SCNC: 135 MMOL/L — SIGNIFICANT CHANGE UP (ref 135–145)
WBC # BLD: 8.14 K/UL — SIGNIFICANT CHANGE UP (ref 3.8–10.5)
WBC # BLD: 8.14 K/UL — SIGNIFICANT CHANGE UP (ref 3.8–10.5)
WBC # FLD AUTO: 8.14 K/UL — SIGNIFICANT CHANGE UP (ref 3.8–10.5)
WBC # FLD AUTO: 8.14 K/UL — SIGNIFICANT CHANGE UP (ref 3.8–10.5)

## 2023-11-30 PROCEDURE — 99283 EMERGENCY DEPT VISIT LOW MDM: CPT

## 2023-11-30 PROCEDURE — 80048 BASIC METABOLIC PNL TOTAL CA: CPT

## 2023-11-30 PROCEDURE — 86140 C-REACTIVE PROTEIN: CPT

## 2023-11-30 PROCEDURE — 36415 COLL VENOUS BLD VENIPUNCTURE: CPT

## 2023-11-30 PROCEDURE — 85652 RBC SED RATE AUTOMATED: CPT

## 2023-11-30 PROCEDURE — 99284 EMERGENCY DEPT VISIT MOD MDM: CPT

## 2023-11-30 PROCEDURE — 99285 EMERGENCY DEPT VISIT HI MDM: CPT

## 2023-11-30 PROCEDURE — 85025 COMPLETE CBC W/AUTO DIFF WBC: CPT

## 2023-11-30 NOTE — ED ADULT NURSE NOTE - NSICDXPASTSURGICALHX_GEN_ALL_CORE_FT
PAST SURGICAL HISTORY:  H/O fracture of tibia left fx repair with hardwaare    H/O knee surgery LEFT KNEE FRACTURE

## 2023-11-30 NOTE — ED ADULT NURSE NOTE - OBJECTIVE STATEMENT
Pt is a 63 year old male that presents to ED with yellow drainage from surgical incision site. Per pt, he had an orthopedic procedure done on November 20th to have hardware removed in his left leg. Pt states he noticed yellow drainage from his left ankle surgical incision site. Pt reports 3/10 pain at the site but denies any weakness or decrease in sensation. Pt states he is still able to ambulate independently with his cane. Pt denies any fevers, chest pain and SOB. Pt is AxOx4 and able to speak in coherent sentences.

## 2023-11-30 NOTE — ED PROVIDER NOTE - OBJECTIVE STATEMENT
62 y/o m s/p left lower ext surgery 11/20 with Dr. Sosa ortho to remove hardware from a previous injury presents c/o discharge from one of the surgical wounds.  Pt stating he removed his dressing 2 days ago and noted yellowish discharge which has been on the dressing during each subsequent change.  Pt called the office of Dr. Sosa and was told if it persists to come to ED.  Pt reports no pain to the area.  Denies fever, chills, all other ROS negative.

## 2023-11-30 NOTE — ED PROVIDER NOTE - PATIENT PORTAL LINK FT
You can access the FollowMyHealth Patient Portal offered by [facility] by registering at the following website: [email or web address]. By joining [facility]’s FollowMyHealth portal, you will also be able to view your health information using other applications (apps) compatible with our system.

## 2023-11-30 NOTE — ED ADULT NURSE NOTE - NSFALLUNIVINTERV_ED_ALL_ED
Bed/Stretcher in lowest position, wheels locked, appropriate side rails in place/Call bell, personal items and telephone in reach/Instruct patient to call for assistance before getting out of bed/chair/stretcher/Non-slip footwear applied when patient is off stretcher/[place] to call system/Physically safe environment - no spills, clutter or unnecessary equipment/Purposeful proactive rounding/Room/bathroom lighting operational, light cord in reach

## 2023-11-30 NOTE — ED PROVIDER NOTE - MUSCULOSKELETAL, MLM
left leg surgical incision to proximal tibia area with staples in place, no surrounding erythema, no discharge from wound, no TTP.  left ankle surgical incision with no discharge, no erythema, no TTP, no fluctuance

## 2023-11-30 NOTE — ED PROVIDER NOTE - ATTENDING APP SHARED VISIT CONTRIBUTION OF CARE
Pt is a 54yo m, h/o surgery and hardware to lle, s/p removal of hardware on 11/20 by Dr. Sosa, who p/w dc from wound noted 2d ago. Pt noted yellow dc on gauze dressing. No fever, chills, increased pain, redness, swelling, n/t/w.  Afebrile. HDS. PE as above. Incisions to lle c/d/i, scant serous fluid expressable from inferior wound. No surrounding erythema, warmth, swelling. NVI. Plan for ortho consult, labs including esr, crp. Will await consult recommendations.

## 2023-11-30 NOTE — ED PROVIDER NOTE - CLINICAL SUMMARY MEDICAL DECISION MAKING FREE TEXT BOX
62 y/o m presents c/o discharge from a surgical incision to left leg.  Pt afebrile, wound appears to be healing well, no pus, no signs of cellulitis.  Labs unremarkable, pt seen by ortho, recommend to rx cefadroxil and f/u with Dr. Sosa.

## 2023-11-30 NOTE — CONSULT NOTE ADULT - SUBJECTIVE AND OBJECTIVE BOX
Orthopaedic Surgery Consult Note    For Surgeon: Dr. Sosa     HPI:  63yMale  Patient is a 63y old  Male who presents with a chief complaint of left ankle wound drainage    HPI:  63M s/p left tibial BUTCH performed on 11/20 by Dr Sosa now presenting to the ER with 2 days of yellow drainage from his ankle incision. Patient states he took down his dressings on Monday and since then has been noticing some yellow discharge from the ankle incision only. Notes knee incisions have been dry. He denies redness, pain in his knee or ankle, difficulty weight bearing. Also denies fever/chills. Denies any medhat pus from the wound, states fluid more of yellowish/clear.      Allergies    No Known Allergies    Intolerances      PAST MEDICAL & SURGICAL HISTORY:  Osteoarthritis      Prediabetes    H/O knee surgery  LEFT KNEE FRACTURE    H/O fracture of tibia  left fx repair with hardwaare        MEDICATIONS  (STANDING):    MEDICATIONS  (PRN):      Vital Signs Last 24 Hrs  T(C): 36.8 (30 Nov 2023 12:14), Max: 36.8 (30 Nov 2023 09:19)  T(F): 98.2 (30 Nov 2023 12:14), Max: 98.3 (30 Nov 2023 09:19)  HR: 79 (30 Nov 2023 12:14) (79 - 110)  BP: 147/87 (30 Nov 2023 12:14) (147/87 - 189/89)  BP(mean): --  RR: 16 (30 Nov 2023 12:14) (16 - 20)  SpO2: 98% (30 Nov 2023 12:14) (98% - 100%)    Parameters below as of 30 Nov 2023 09:19  Patient On (Oxygen Delivery Method): room air        Physical Exam:                          12.8   8.14  )-----------( 226      ( 30 Nov 2023 09:56 )             37.8     11-30    135  |  99  |  27<H>  ----------------------------<  99  4.4   |  27  |  1.04    Ca    9.2      30 Nov 2023 09:56    VS: stable, reviewed per nursing documentation  General: No acute distress, resting comfortably  Neuro: Alert, oriented x 3  Psych: Normal mood & affect  HEENT: normocephalic, atraumatic   Neck: trachea midline  Respiratory: nonlabored on room air   CV: no cyanosis, no peripheral edema   GI: nontender, nondistended   Skin: Incisions left knee and left ankle as below, resolving ecchymosis throughout left lower extremity s/p BUTCH procedure   MSK: atraumatic with exception of below  LLE    -Inspection/palpation: longitudinal approx 10cm incision over midline left knee intact with subcutaneous sutures. No active drainage, no tenderness or fluctuance  - [id number] incision inferior medial left knee intact with staples, no drainage, no erythema, no tenderness or fluctuance   - 3cm incision medial left ankle intact with staples, no active drainage, no erythema, no tenderness, no fluctuance     -Compartments: generalized swelling left lower extremity, soft, nontender bilaterally     -ROM: ROM left knee and ankle intact without pain     -Quadriceps/TA/GS/EHL/FHL 5/5 bilateral lower extremities     -Sensation:  intact to light touch bilateral lower extremities    -Pulses: 2+ DP pulse LLE, extremity warm and well perfused  contralateral extremity within normal limits         A/P: 63yMale    -Discussed with Dr. Marmolejo Pager [phone]   Orthopaedic Surgery Consult Note    For Surgeon: Dr. Sosa     HPI:  63yMale  Patient is a 63y old  Male who presents with a chief complaint of left ankle wound drainage    HPI:  63M s/p left tibial BUTCH performed on 11/20 by Dr Sosa now presenting to the ER with 2 days of yellow drainage from his ankle incision. Patient states he took down his dressings on Monday and since then has been noticing some yellow discharge from the ankle incision only. Notes knee incisions have been dry. He denies redness, pain in his knee or ankle, difficulty weight bearing. Also denies fever/chills. Denies any medhat pus from the wound, states fluid more of yellowish/clear.      Allergies    No Known Allergies    Intolerances      PAST MEDICAL & SURGICAL HISTORY:  Osteoarthritis      Prediabetes    H/O knee surgery  LEFT KNEE FRACTURE    H/O fracture of tibia  left fx repair with hardwaare        MEDICATIONS  (STANDING):    MEDICATIONS  (PRN):      Vital Signs Last 24 Hrs  T(C): 36.8 (30 Nov 2023 12:14), Max: 36.8 (30 Nov 2023 09:19)  T(F): 98.2 (30 Nov 2023 12:14), Max: 98.3 (30 Nov 2023 09:19)  HR: 79 (30 Nov 2023 12:14) (79 - 110)  BP: 147/87 (30 Nov 2023 12:14) (147/87 - 189/89)  BP(mean): --  RR: 16 (30 Nov 2023 12:14) (16 - 20)  SpO2: 98% (30 Nov 2023 12:14) (98% - 100%)    Parameters below as of 30 Nov 2023 09:19  Patient On (Oxygen Delivery Method): room air        Physical Exam:                          12.8   8.14  )-----------( 226      ( 30 Nov 2023 09:56 )             37.8     11-30    135  |  99  |  27<H>  ----------------------------<  99  4.4   |  27  |  1.04    Ca    9.2      30 Nov 2023 09:56    VS: stable, reviewed per nursing documentation  General: No acute distress, resting comfortably  Neuro: Alert, oriented x 3  Psych: Normal mood & affect  HEENT: normocephalic, atraumatic   Neck: trachea midline  Respiratory: nonlabored on room air   CV: no cyanosis, no peripheral edema   GI: nontender, nondistended   Skin: Incisions left knee and left ankle as below, resolving ecchymosis throughout left lower extremity s/p BUTCH procedure   MSK: atraumatic with exception of below  LLE    -Inspection/palpation: longitudinal approx 10cm incision over midline left knee intact with subcutaneous sutures. No active drainage, no tenderness or fluctuance  - [id number] incision inferior medial left knee intact with staples, no drainage, no erythema, no tenderness or fluctuance   - 3cm incision medial left ankle intact with staples, no active drainage, no erythema, no tenderness, no fluctuance     -Compartments: generalized swelling left lower extremity, soft, nontender bilaterally     -ROM: ROM left knee and ankle intact without pain     -Quadriceps/TA/GS/EHL/FHL 5/5 bilateral lower extremities     -Sensation:  intact to light touch bilateral lower extremities    -Pulses: 2+ DP pulse LLE, extremity warm and well perfused  contralateral extremity within normal limits         A/P: 63yMale s/p left tibia BUTCH of tibial nail on 11/20 with Dr. Sosa, presents with c/o scant wound drainage from distal medial left ankle incision    -Patient seen and examined in ED- no evidence of acute surgical site infection  - no cellulitis, no tenderness, no active drainage able to be expressed from distal incision, other incisions also without evidence of acute infection  - wound cleansed and dressed with gauze/tegaderm  - advised patient to elevate extremity to help reduce swelling  - Recommend Cefedroxil 500mg BID x5 days  - WBAT LLE  - Dispo: stable for dc home from ED from Orthopedic perspective, patient should follow up with Dr. Sosa on Tuesday 12/5 as scheduled.  - Patient expresses understanding and agreeable to the plan    -Discussed with Dr. Sosa    Ortho Pager [phone]

## 2023-12-05 ENCOUNTER — APPOINTMENT (OUTPATIENT)
Dept: ORTHOPEDIC SURGERY | Facility: CLINIC | Age: 63
End: 2023-12-05
Payer: MEDICAID

## 2023-12-05 VITALS
HEART RATE: 74 BPM | WEIGHT: 185 LBS | OXYGEN SATURATION: 97 % | HEIGHT: 67 IN | SYSTOLIC BLOOD PRESSURE: 129 MMHG | DIASTOLIC BLOOD PRESSURE: 81 MMHG | BODY MASS INDEX: 29.03 KG/M2

## 2023-12-05 PROCEDURE — 99024 POSTOP FOLLOW-UP VISIT: CPT

## 2023-12-12 ENCOUNTER — APPOINTMENT (OUTPATIENT)
Dept: ORTHOPEDIC SURGERY | Facility: CLINIC | Age: 63
End: 2023-12-12
Payer: MEDICAID

## 2023-12-12 VITALS
WEIGHT: 185 LBS | OXYGEN SATURATION: 97 % | BODY MASS INDEX: 29.03 KG/M2 | DIASTOLIC BLOOD PRESSURE: 71 MMHG | HEART RATE: 85 BPM | HEIGHT: 67 IN | SYSTOLIC BLOOD PRESSURE: 120 MMHG

## 2023-12-12 DIAGNOSIS — Z96.7 PRESENCE OF OTHER BONE AND TENDON IMPLANTS: ICD-10-CM

## 2023-12-12 PROCEDURE — 99024 POSTOP FOLLOW-UP VISIT: CPT

## 2024-02-08 ENCOUNTER — APPOINTMENT (OUTPATIENT)
Dept: ORTHOPEDIC SURGERY | Facility: CLINIC | Age: 64
End: 2024-02-08
Payer: MEDICAID

## 2024-02-08 VITALS
WEIGHT: 185 LBS | HEART RATE: 78 BPM | OXYGEN SATURATION: 96 % | SYSTOLIC BLOOD PRESSURE: 154 MMHG | BODY MASS INDEX: 29.03 KG/M2 | HEIGHT: 67 IN | DIASTOLIC BLOOD PRESSURE: 73 MMHG

## 2024-02-08 DIAGNOSIS — Z01.818 ENCOUNTER FOR OTHER PREPROCEDURAL EXAMINATION: ICD-10-CM

## 2024-02-08 PROCEDURE — 99024 POSTOP FOLLOW-UP VISIT: CPT

## 2024-02-11 PROBLEM — Z01.818 PREPROCEDURAL EXAMINATION: Status: ACTIVE | Noted: 2023-03-28

## 2024-02-11 NOTE — PHYSICAL EXAM
[Well Nourished] : well nourished [No Acute Distress] : no acute distress [Well Developed] : well developed [Well-Appearing] : well-appearing [PERRL] : pupils equal round and reactive to light [Normal Sclera/Conjunctiva] : normal sclera/conjunctiva [EOMI] : extraocular movements intact [Normal Oropharynx] : the oropharynx was normal [Normal Outer Ear/Nose] : the outer ears and nose were normal in appearance [No Lymphadenopathy] : no lymphadenopathy [No JVD] : no jugular venous distention [Supple] : supple [Thyroid Normal, No Nodules] : the thyroid was normal and there were no nodules present [No Accessory Muscle Use] : no accessory muscle use [Clear to Auscultation] : lungs were clear to auscultation bilaterally [No Respiratory Distress] : no respiratory distress  [Regular Rhythm] : with a regular rhythm [Normal Rate] : normal rate  [No Murmur] : no murmur heard [Normal S1, S2] : normal S1 and S2 [No Carotid Bruits] : no carotid bruits [No Abdominal Bruit] : a ~M bruit was not heard ~T in the abdomen [No Varicosities] : no varicosities [Pedal Pulses Present] : the pedal pulses are present [No Edema] : there was no peripheral edema [No Extremity Clubbing/Cyanosis] : no extremity clubbing/cyanosis [No Palpable Aorta] : no palpable aorta [Non Tender] : non-tender [Soft] : abdomen soft [No Masses] : no abdominal mass palpated [Non-distended] : non-distended [Normal Bowel Sounds] : normal bowel sounds [No HSM] : no HSM [Normal Anterior Cervical Nodes] : no anterior cervical lymphadenopathy [No CVA Tenderness] : no CVA  tenderness [Normal Posterior Cervical Nodes] : no posterior cervical lymphadenopathy [No Spinal Tenderness] : no spinal tenderness [No Rash] : no rash [Coordination Grossly Intact] : coordination grossly intact [No Focal Deficits] : no focal deficits [Normal Affect] : the affect was normal [Deep Tendon Reflexes (DTR)] : deep tendon reflexes were 2+ and symmetric [Normal Gait] : normal gait [Normal Insight/Judgement] : insight and judgment were intact [de-identified] : Diastasis recti, small umbiical hernia, [de-identified] : Gait antalgic, Left knee surgical scars well healed. Tender to palpation medial joint line. ROM 5-100 deg

## 2024-02-11 NOTE — HISTORY OF PRESENT ILLNESS
[FreeTextEntry2] : 2/22/2024 [FreeTextEntry1] : Left total knee arthroplasty [FreeTextEntry4] : KWAKU STARK STARK is a 63 year old male who presents for preprocedural evaluation.  Hx Pre DM, Pneumonia at 40 yo no TB  [FreeTextEntry3] : Dr. Nickolas Sosa

## 2024-02-11 NOTE — INTERPRETER SERVICES
[[employer] ] : provided by [employer]   [Interpreters_FullName] : Francia [Interpreters_IDNumber] : [id number]

## 2024-02-11 NOTE — ASSESSMENT
[Patient Optimized for Surgery] : Patient optimized for surgery [FreeTextEntry4] : KWAKU STARK is a 63 year old male presents for preoperative medical evaluation:  Fowler score 0 RCRI class I - 3.9% 30-day risk of death, MI or cardiac arrest METs >4 Labs (11/30/23): CBC, CMP, Coags, wnl (10/27/23) UCx no growth, A1c 5.5 ECG (10/27/23): NSR 60 bpm CXR (3/22/23): Biapical nodular pleural thickening thought to be postinflammatory and chronic. Linear opacity over the lateral aspect of the right upper lung zone compatible with a focus of scarring and/or atelectasis. There is some mild right hemidiaphragm elevation. No consolidation. There are no pleural effusions. There is no pneumothorax.  The cardiomediastinal silhouette, bones and soft tissues are unremarkable.  Plan:  Avoid Intra-/alexandria-operative hypotension. Prophylactic antibiotics recommended.  Stop NSAIDs, vitamins, supplements and herbal therapies at least 5 days prior to procedure.  DVT prophylaxis and early mobilization is recommended. Early use of incentive spirometry recommended. All questions answered and patient verbalized understanding.

## 2024-02-20 RX ORDER — PANTOPRAZOLE 40 MG/1
40 TABLET, DELAYED RELEASE ORAL DAILY
Qty: 30 | Refills: 2 | Status: ACTIVE | COMMUNITY
Start: 2024-02-20 | End: 1900-01-01

## 2024-02-20 RX ORDER — ASPIRIN ENTERIC COATED TABLETS 81 MG 81 MG/1
81 TABLET, DELAYED RELEASE ORAL
Qty: 60 | Refills: 0 | Status: ACTIVE | COMMUNITY
Start: 2024-02-20 | End: 1900-01-01

## 2024-02-20 RX ORDER — ACETAMINOPHEN 500 MG/1
500 TABLET ORAL
Qty: 180 | Refills: 1 | Status: ACTIVE | COMMUNITY
Start: 2024-02-20 | End: 1900-01-01

## 2024-02-20 RX ORDER — MORPHINE SULFATE 15 MG/1
15 TABLET, FILM COATED, EXTENDED RELEASE ORAL
Qty: 14 | Refills: 0 | Status: ACTIVE | COMMUNITY
Start: 2024-02-20 | End: 1900-01-01

## 2024-02-20 RX ORDER — OXYCODONE 5 MG/1
5 TABLET ORAL
Qty: 50 | Refills: 0 | Status: ACTIVE | COMMUNITY
Start: 2024-02-20 | End: 1900-01-01

## 2024-02-20 RX ORDER — CELECOXIB 200 MG/1
200 CAPSULE ORAL TWICE DAILY
Qty: 60 | Refills: 2 | Status: ACTIVE | COMMUNITY
Start: 2024-02-20 | End: 1900-01-01

## 2024-02-21 ENCOUNTER — TRANSCRIPTION ENCOUNTER (OUTPATIENT)
Age: 64
End: 2024-02-21

## 2024-02-21 VITALS
RESPIRATION RATE: 18 BRPM | TEMPERATURE: 99 F | OXYGEN SATURATION: 97 % | DIASTOLIC BLOOD PRESSURE: 75 MMHG | WEIGHT: 182.98 LBS | HEIGHT: 67 IN | SYSTOLIC BLOOD PRESSURE: 126 MMHG | HEART RATE: 75 BPM

## 2024-02-21 RX ORDER — IBUPROFEN 200 MG
1 TABLET ORAL
Qty: 0 | Refills: 0 | DISCHARGE

## 2024-02-21 RX ORDER — POVIDONE-IODINE 5 %
1 AEROSOL (ML) TOPICAL ONCE
Refills: 0 | Status: DISCONTINUED | OUTPATIENT
Start: 2024-02-22 | End: 2024-02-26

## 2024-02-21 NOTE — ASU PATIENT PROFILE, ADULT - ANESTHESIA, PREVIOUS REACTION, PROFILE
-at home on valsartan 320mg, amlodipine 5mg, and metoprolol succinate 50mg   -c/w home medications
none

## 2024-02-21 NOTE — ASU PATIENT PROFILE, ADULT - IS PATIENT PREGNANT?
"Chief Complaint   Patient presents with     Well Child       Initial BP 90/54 (BP Location: Right arm, Patient Position: Sitting, Cuff Size: Child)  Pulse 123  Temp 100  F (37.8  C) (Tympanic)  Resp 22  Ht 3' 7\" (1.092 m)  Wt 43 lb (19.5 kg)  SpO2 99%  BMI 16.35 kg/m2 Estimated body mass index is 16.35 kg/(m^2) as calculated from the following:    Height as of this encounter: 3' 7\" (1.092 m).    Weight as of this encounter: 43 lb (19.5 kg).  Medication Reconciliation: complete    Ashley A. Lechevalier, LPN  " not applicable (Male)

## 2024-02-21 NOTE — H&P ADULT - NSHPLABSRESULTS_GEN_ALL_CORE
preop cbc h/h 12.8/37.8  Cmp Cr .91 preop  ua - within normal limits, reviewed by medical clearance  pt/inr/ptt - within normal limits, reviewed by medical clearance   EKG NSR  CXR - per report: "biapical nodular pleural thickening thought to be postinflammatory and chronic. linear opcity over the lateral aspect of the right upper lung zone compatible with a focus of scarring and/or atelectasis. MIld right hemidiaphragm elevation. No consolidation. There are no pleural effusions. No pneumothorax"  Povidone iodine nasal swab to be given day of surgery

## 2024-02-21 NOTE — H&P ADULT - PROBLEM SELECTOR PLAN 1
Admit to Orthopaedic Service.  Presents today for elective left total knee replacement   Pt medically stable and cleared for procedure today by Dr. Wilks

## 2024-02-21 NOTE — H&P ADULT - HISTORY OF PRESENT ILLNESS
63M with left knee pain   Presents for left total knee replacement  63M s/p Left tibia BUTCH November 2023 with left knee pain worsened over time without improvement. Failed conservative treatments. Denies numbness, tingling. Ambulates without assist. Presents today for elective left total knee replacement.

## 2024-02-21 NOTE — H&P ADULT - NSHPPHYSICALEXAM_GEN_ALL_CORE
MSK:  decreased ROM left knee 2/2 pain LLE: knee decreased ROM 2/2 pain, healed incision visible  sensation intact  pulses intact  EHL/FHL/TA/GS 5/5  rest of PE per MD clearance

## 2024-02-21 NOTE — ASU PATIENT PROFILE, ADULT - FALL HARM RISK - PATIENT NEEDS ASSISTANCE
S/p fall with unsteady gait; I reviewed TTE - normal LV size and systolic function, mild diastolic dysfunction, mild to moderate tricuspid regurgitation, mild pulmonary HTN, AV sclerosis.
No assistance needed

## 2024-02-22 ENCOUNTER — RESULT REVIEW (OUTPATIENT)
Age: 64
End: 2024-02-22

## 2024-02-22 ENCOUNTER — APPOINTMENT (OUTPATIENT)
Dept: ORTHOPEDIC SURGERY | Facility: HOSPITAL | Age: 64
End: 2024-02-22

## 2024-02-22 ENCOUNTER — INPATIENT (INPATIENT)
Facility: HOSPITAL | Age: 64
LOS: 3 days | Discharge: NURSING, REHAB OR LONG-TERM CARE | DRG: 470 | End: 2024-02-26
Attending: ORTHOPAEDIC SURGERY | Admitting: ORTHOPAEDIC SURGERY
Payer: MEDICAID

## 2024-02-22 DIAGNOSIS — R73.03 PREDIABETES: ICD-10-CM

## 2024-02-22 DIAGNOSIS — Z87.81 PERSONAL HISTORY OF (HEALED) TRAUMATIC FRACTURE: Chronic | ICD-10-CM

## 2024-02-22 DIAGNOSIS — M17.12 UNILATERAL PRIMARY OSTEOARTHRITIS, LEFT KNEE: ICD-10-CM

## 2024-02-22 DIAGNOSIS — Z98.890 OTHER SPECIFIED POSTPROCEDURAL STATES: Chronic | ICD-10-CM

## 2024-02-22 PROCEDURE — 73560 X-RAY EXAM OF KNEE 1 OR 2: CPT | Mod: 26,LT

## 2024-02-22 PROCEDURE — S2900 ROBOTIC SURGICAL SYSTEM: CPT | Mod: NC

## 2024-02-22 PROCEDURE — 27447 TOTAL KNEE ARTHROPLASTY: CPT | Mod: LT

## 2024-02-22 DEVICE — PIN CAS FIX 3.2X150MM: Type: IMPLANTABLE DEVICE | Site: LEFT | Status: FUNCTIONAL

## 2024-02-22 DEVICE — SURF ART PERSONA LT 6-9 EF 11MM: Type: IMPLANTABLE DEVICE | Site: LEFT | Status: FUNCTIONAL

## 2024-02-22 DEVICE — FEM PERSONA PS CMT CCR STD SZ9 L: Type: IMPLANTABLE DEVICE | Site: LEFT | Status: FUNCTIONAL

## 2024-02-22 DEVICE — ZIMMER FEMALE HEX SCREW MAGNETIC 2.5MM X 25MM: Type: IMPLANTABLE DEVICE | Site: LEFT | Status: FUNCTIONAL

## 2024-02-22 DEVICE — STEM EXT PERSONA 14MM PLUS 30M: Type: IMPLANTABLE DEVICE | Site: LEFT | Status: FUNCTIONAL

## 2024-02-22 DEVICE — IMPLANTABLE DEVICE: Type: IMPLANTABLE DEVICE | Site: LEFT | Status: FUNCTIONAL

## 2024-02-22 DEVICE — PIN FIX CAS 3.2X80MM STR: Type: IMPLANTABLE DEVICE | Site: LEFT | Status: FUNCTIONAL

## 2024-02-22 DEVICE — STEM TIB PSN SZ E L 5 DEG: Type: IMPLANTABLE DEVICE | Site: LEFT | Status: FUNCTIONAL

## 2024-02-22 RX ORDER — SENNA PLUS 8.6 MG/1
2 TABLET ORAL AT BEDTIME
Refills: 0 | Status: DISCONTINUED | OUTPATIENT
Start: 2024-02-22 | End: 2024-02-26

## 2024-02-22 RX ORDER — CELECOXIB 200 MG/1
400 CAPSULE ORAL ONCE
Refills: 0 | Status: COMPLETED | OUTPATIENT
Start: 2024-02-22 | End: 2024-02-22

## 2024-02-22 RX ORDER — CHLORHEXIDINE GLUCONATE 213 G/1000ML
1 SOLUTION TOPICAL ONCE
Refills: 0 | Status: COMPLETED | OUTPATIENT
Start: 2024-02-22 | End: 2024-02-22

## 2024-02-22 RX ORDER — ACETAMINOPHEN 500 MG
1000 TABLET ORAL ONCE
Refills: 0 | Status: COMPLETED | OUTPATIENT
Start: 2024-02-22 | End: 2024-02-22

## 2024-02-22 RX ORDER — SODIUM CHLORIDE 9 MG/ML
1000 INJECTION, SOLUTION INTRAVENOUS
Refills: 0 | Status: DISCONTINUED | OUTPATIENT
Start: 2024-02-23 | End: 2024-02-26

## 2024-02-22 RX ORDER — CEFAZOLIN SODIUM 1 G
2000 VIAL (EA) INJECTION EVERY 8 HOURS
Refills: 0 | Status: COMPLETED | OUTPATIENT
Start: 2024-02-22 | End: 2024-02-23

## 2024-02-22 RX ORDER — ACETAMINOPHEN 500 MG
1000 TABLET ORAL EVERY 8 HOURS
Refills: 0 | Status: DISCONTINUED | OUTPATIENT
Start: 2024-02-22 | End: 2024-02-26

## 2024-02-22 RX ORDER — MAGNESIUM HYDROXIDE 400 MG/1
30 TABLET, CHEWABLE ORAL DAILY
Refills: 0 | Status: DISCONTINUED | OUTPATIENT
Start: 2024-02-22 | End: 2024-02-26

## 2024-02-22 RX ORDER — HYDROMORPHONE HYDROCHLORIDE 2 MG/ML
0.5 INJECTION INTRAMUSCULAR; INTRAVENOUS; SUBCUTANEOUS EVERY 4 HOURS
Refills: 0 | Status: DISCONTINUED | OUTPATIENT
Start: 2024-02-22 | End: 2024-02-26

## 2024-02-22 RX ORDER — APREPITANT 80 MG/1
40 CAPSULE ORAL ONCE
Refills: 0 | Status: COMPLETED | OUTPATIENT
Start: 2024-02-22 | End: 2024-02-22

## 2024-02-22 RX ORDER — CELECOXIB 200 MG/1
200 CAPSULE ORAL EVERY 12 HOURS
Refills: 0 | Status: DISCONTINUED | OUTPATIENT
Start: 2024-02-23 | End: 2024-02-26

## 2024-02-22 RX ORDER — POLYETHYLENE GLYCOL 3350 17 G/17G
17 POWDER, FOR SOLUTION ORAL AT BEDTIME
Refills: 0 | Status: DISCONTINUED | OUTPATIENT
Start: 2024-02-22 | End: 2024-02-26

## 2024-02-22 RX ORDER — ONDANSETRON 8 MG/1
4 TABLET, FILM COATED ORAL EVERY 6 HOURS
Refills: 0 | Status: DISCONTINUED | OUTPATIENT
Start: 2024-02-22 | End: 2024-02-26

## 2024-02-22 RX ORDER — OXYCODONE HYDROCHLORIDE 5 MG/1
10 TABLET ORAL EVERY 4 HOURS
Refills: 0 | Status: DISCONTINUED | OUTPATIENT
Start: 2024-02-22 | End: 2024-02-26

## 2024-02-22 RX ORDER — OXYCODONE HYDROCHLORIDE 5 MG/1
5 TABLET ORAL EVERY 4 HOURS
Refills: 0 | Status: DISCONTINUED | OUTPATIENT
Start: 2024-02-22 | End: 2024-02-26

## 2024-02-22 RX ORDER — HYDROMORPHONE HYDROCHLORIDE 2 MG/ML
0.5 INJECTION INTRAMUSCULAR; INTRAVENOUS; SUBCUTANEOUS
Refills: 0 | Status: DISCONTINUED | OUTPATIENT
Start: 2024-02-22 | End: 2024-02-26

## 2024-02-22 RX ORDER — PANTOPRAZOLE SODIUM 20 MG/1
40 TABLET, DELAYED RELEASE ORAL
Refills: 0 | Status: DISCONTINUED | OUTPATIENT
Start: 2024-02-22 | End: 2024-02-26

## 2024-02-22 RX ORDER — ASPIRIN/CALCIUM CARB/MAGNESIUM 324 MG
81 TABLET ORAL
Refills: 0 | Status: DISCONTINUED | OUTPATIENT
Start: 2024-02-23 | End: 2024-02-26

## 2024-02-22 RX ORDER — KETOROLAC TROMETHAMINE 30 MG/ML
15 SYRINGE (ML) INJECTION EVERY 6 HOURS
Refills: 0 | Status: DISCONTINUED | OUTPATIENT
Start: 2024-02-22 | End: 2024-02-23

## 2024-02-22 RX ORDER — HYDROMORPHONE HYDROCHLORIDE 2 MG/ML
0.5 INJECTION INTRAMUSCULAR; INTRAVENOUS; SUBCUTANEOUS
Refills: 0 | Status: COMPLETED | OUTPATIENT
Start: 2024-02-22

## 2024-02-22 RX ADMIN — APREPITANT 40 MILLIGRAM(S): 80 CAPSULE ORAL at 12:41

## 2024-02-22 RX ADMIN — CHLORHEXIDINE GLUCONATE 1 APPLICATION(S): 213 SOLUTION TOPICAL at 12:41

## 2024-02-22 RX ADMIN — Medication 1000 MILLIGRAM(S): at 12:41

## 2024-02-22 RX ADMIN — Medication 1000 MILLIGRAM(S): at 21:00

## 2024-02-22 RX ADMIN — Medication 100 MILLIGRAM(S): at 21:00

## 2024-02-22 RX ADMIN — CELECOXIB 400 MILLIGRAM(S): 200 CAPSULE ORAL at 12:41

## 2024-02-22 NOTE — PRE-ANESTHESIA EVALUATION ADULT - NSATTENDATTESTRD_GEN_ALL_CORE
[facility] The patient has been re-examined and I agree with the above assessment or I updated with my findings.

## 2024-02-22 NOTE — PROGRESS NOTE ADULT - SUBJECTIVE AND OBJECTIVE BOX
Ortho Note  Patient seen and examined in PACU  Pt comfortable without complaints, pain controlled  Denies CP, SOB, N/V, numbness/tingling     Vital Signs Last 24 Hrs  T(C): 36.3 (02-22-24 @ 20:40), Max: 36.7 (02-22-24 @ 17:40)  T(F): 97.4 (02-22-24 @ 20:40), Max: 98 (02-22-24 @ 17:40)  HR: 86 (02-22-24 @ 20:40) (60 - 88)  BP: 137/77 (02-22-24 @ 20:40) (96/55 - 137/77)  BP(mean): 100 (02-22-24 @ 20:40) (71 - 100)  RR: 19 (02-22-24 @ 20:40) (16 - 40)  SpO2: 94% (02-22-24 @ 20:40) (92% - 99%)  I&O's Summary    22 Feb 2024 07:01  -  22 Feb 2024 21:13  --------------------------------------------------------  IN: 300 mL / OUT: 0 mL / NET: 300 mL        General: Pt Alert and oriented, NAD  DSG C/D/I - Prineo+ aquacel  Pulses: 2+ DP  Anesthesia block still in effect  Sensation: SILT  Motor: Deferred 2/2 block              A/P: 63yMale POD#0 s/p L TKA  - Stable  - Pain Control  - DVT ppx: asa 81 mg bid  - PT, WBS: WBAT  Dispo: Pending PT eval    Ortho Pager [phone]

## 2024-02-22 NOTE — PRE-ANESTHESIA EVALUATION ADULT - NSANTHOSAYNRD_GEN_A_CORE
No. FILIBERTO screening performed.  STOP BANG Legend: 0-2 = LOW Risk; 3-4 = INTERMEDIATE Risk; 5-8 = HIGH Risk yes...

## 2024-02-23 ENCOUNTER — TRANSCRIPTION ENCOUNTER (OUTPATIENT)
Age: 64
End: 2024-02-23

## 2024-02-23 LAB
ANION GAP SERPL CALC-SCNC: 8 MMOL/L — SIGNIFICANT CHANGE UP (ref 5–17)
BUN SERPL-MCNC: 20 MG/DL — SIGNIFICANT CHANGE UP (ref 7–23)
CALCIUM SERPL-MCNC: 8.9 MG/DL — SIGNIFICANT CHANGE UP (ref 8.4–10.5)
CHLORIDE SERPL-SCNC: 103 MMOL/L — SIGNIFICANT CHANGE UP (ref 96–108)
CO2 SERPL-SCNC: 26 MMOL/L — SIGNIFICANT CHANGE UP (ref 22–31)
CREAT SERPL-MCNC: 0.79 MG/DL — SIGNIFICANT CHANGE UP (ref 0.5–1.3)
EGFR: 100 ML/MIN/1.73M2 — SIGNIFICANT CHANGE UP
GLUCOSE SERPL-MCNC: 120 MG/DL — HIGH (ref 70–99)
HCT VFR BLD CALC: 36.8 % — LOW (ref 39–50)
HGB BLD-MCNC: 12.5 G/DL — LOW (ref 13–17)
MCHC RBC-ENTMCNC: 29.4 PG — SIGNIFICANT CHANGE UP (ref 27–34)
MCHC RBC-ENTMCNC: 34 GM/DL — SIGNIFICANT CHANGE UP (ref 32–36)
MCV RBC AUTO: 86.6 FL — SIGNIFICANT CHANGE UP (ref 80–100)
NRBC # BLD: 0 /100 WBCS — SIGNIFICANT CHANGE UP (ref 0–0)
PLATELET # BLD AUTO: 248 K/UL — SIGNIFICANT CHANGE UP (ref 150–400)
POTASSIUM SERPL-MCNC: 4.8 MMOL/L — SIGNIFICANT CHANGE UP (ref 3.5–5.3)
POTASSIUM SERPL-SCNC: 4.8 MMOL/L — SIGNIFICANT CHANGE UP (ref 3.5–5.3)
RBC # BLD: 4.25 M/UL — SIGNIFICANT CHANGE UP (ref 4.2–5.8)
RBC # FLD: 12.3 % — SIGNIFICANT CHANGE UP (ref 10.3–14.5)
SODIUM SERPL-SCNC: 137 MMOL/L — SIGNIFICANT CHANGE UP (ref 135–145)
WBC # BLD: 17.93 K/UL — HIGH (ref 3.8–10.5)
WBC # FLD AUTO: 17.93 K/UL — HIGH (ref 3.8–10.5)

## 2024-02-23 PROCEDURE — 99222 1ST HOSP IP/OBS MODERATE 55: CPT

## 2024-02-23 RX ADMIN — CELECOXIB 200 MILLIGRAM(S): 200 CAPSULE ORAL at 19:01

## 2024-02-23 RX ADMIN — Medication 15 MILLIGRAM(S): at 19:42

## 2024-02-23 RX ADMIN — Medication 1000 MILLIGRAM(S): at 22:34

## 2024-02-23 RX ADMIN — Medication 15 MILLIGRAM(S): at 12:30

## 2024-02-23 RX ADMIN — Medication 15 MILLIGRAM(S): at 06:06

## 2024-02-23 RX ADMIN — Medication 100 MILLIGRAM(S): at 06:07

## 2024-02-23 RX ADMIN — Medication 15 MILLIGRAM(S): at 12:07

## 2024-02-23 RX ADMIN — POLYETHYLENE GLYCOL 3350 17 GRAM(S): 17 POWDER, FOR SOLUTION ORAL at 22:35

## 2024-02-23 RX ADMIN — CELECOXIB 200 MILLIGRAM(S): 200 CAPSULE ORAL at 19:42

## 2024-02-23 RX ADMIN — Medication 81 MILLIGRAM(S): at 19:01

## 2024-02-23 RX ADMIN — PANTOPRAZOLE SODIUM 40 MILLIGRAM(S): 20 TABLET, DELAYED RELEASE ORAL at 06:07

## 2024-02-23 RX ADMIN — Medication 1000 MILLIGRAM(S): at 15:00

## 2024-02-23 RX ADMIN — CELECOXIB 200 MILLIGRAM(S): 200 CAPSULE ORAL at 06:07

## 2024-02-23 RX ADMIN — Medication 15 MILLIGRAM(S): at 19:01

## 2024-02-23 RX ADMIN — Medication 15 MILLIGRAM(S): at 00:57

## 2024-02-23 RX ADMIN — Medication 1000 MILLIGRAM(S): at 14:01

## 2024-02-23 RX ADMIN — OXYCODONE HYDROCHLORIDE 5 MILLIGRAM(S): 5 TABLET ORAL at 03:00

## 2024-02-23 RX ADMIN — SENNA PLUS 2 TABLET(S): 8.6 TABLET ORAL at 22:34

## 2024-02-23 RX ADMIN — Medication 1000 MILLIGRAM(S): at 06:07

## 2024-02-23 NOTE — DISCHARGE NOTE PROVIDER - CARE PROVIDER_API CALL
Nickolas Sosa  Joint Reconstruction  [address]  [place] [address]  Phone: [phone]  Fax: [phone]  Follow Up Time: 2 weeks

## 2024-02-23 NOTE — CONSULT NOTE ADULT - ASSESSMENT
63-year-old male with a PMHx of pre-diabetes who presented for elective left TKA.     #Left Knee OA   -further management as per ortho, s/p left TKA on 2/22   -pain control, bowel regimen and IS   -pending PT evaluation    -DVT PPx: ASA BID     #Leukocytosis    -likely reactive post-op, monitor off Abx     #Pre-Diabetes   -outpatient PCP follow up    DVT PPx: ASA BID    Dispo: pending PT Continue Regimen: Clobetasol ointment prn Detail Level: Zone Samples Given: Cerave psoriasis cream No

## 2024-02-23 NOTE — PHYSICAL THERAPY INITIAL EVALUATION ADULT - DIAGNOSIS, PT EVAL
???????????????????????????? 4H: Impaired Joint Mobility, Motor Function, Muscle Performance, and Range of Motion Associated with Joint Arthroplasty

## 2024-02-23 NOTE — CONSULT NOTE ADULT - SUBJECTIVE AND OBJECTIVE BOX
HPI as per ortho H&P: 63M s/p Left tibia BUTCH November 2023 with left knee pain worsened over time without improvement. Failed conservative treatments. Denies numbness, tingling. Ambulates without assist. Presents today for elective left total knee replacement.    Today, patient is doing well.  Pain is well controlled.  Urinating spontaneously  Denies HA, CP, SOB, abdominal pain, nausea, vomiting, fever, chills or diarrhea.     PMHx: pre-diabetes     PSHx: left knee surgery    FHx: non-contributory     SHx: denies alcohol, tobacco or illicit drug use    Allergies NKDA    Home Medications:   · vitamin D3  daily: Last Dose Taken: 21-Feb-2024 AM    Objective:   Vital Signs Last 24 Hrs  T(C): 36.6 (23 Feb 2024 10:15), Max: 36.7 (22 Feb 2024 17:40)  T(F): 97.9 (23 Feb 2024 10:15), Max: 98.1 (22 Feb 2024 21:50)  HR: 93 (23 Feb 2024 10:15) (60 - 94)  BP: 147/62 (23 Feb 2024 10:15) (96/55 - 147/76)  BP(mean): 100 (22 Feb 2024 20:40) (71 - 100)  RR: 17 (23 Feb 2024 10:15) (16 - 40)  SpO2: 96% (23 Feb 2024 10:15) (92% - 99%)    Parameters below as of 23 Feb 2024 10:15  Patient On (Oxygen Delivery Method): room air    Physical Exam:   -Gen: NAD, resting in bed  -HEENT: EOMI, PERRL, no scleral icterus  -CV: normal S1 and S2  -Lungs: CTABL, normal respiratory effort on RA  -Ab: soft, NT, ND, normal BS  -Ext: no LE edema, , left knee dressing c/d/i  -Neuro: A&O x 3, no focal deficits     Labs:                        12.5   17.93 )-----------( 248      ( 23 Feb 2024 05:30 )             36.8       02-23    137  |  103  |  20  ----------------------------<  120<H>  4.8   |  26  |  0.79    Ca    8.9      23 Feb 2024 05:30    Medications:  MEDICATIONS  (STANDING):  acetaminophen     Tablet .. 1000 milliGRAM(s) Oral every 8 hours  aspirin  chewable 81 milliGRAM(s) Oral two times a day  celecoxib 200 milliGRAM(s) Oral every 12 hours  HYDROmorphone  Injectable 0.5 milliGRAM(s) IV Push every 15 minutes  ketorolac   Injectable 15 milliGRAM(s) IV Push every 6 hours  lactated ringers. 1000 milliLiter(s) (100 mL/Hr) IV Continuous <Continuous>  pantoprazole    Tablet 40 milliGRAM(s) Oral before breakfast  polyethylene glycol 3350 17 Gram(s) Oral at bedtime  povidone iodine 5% Nasal Swab 1 Application(s) Both Nostrils once  senna 2 Tablet(s) Oral at bedtime    MEDICATIONS  (PRN):  HYDROmorphone  Injectable 0.5 milliGRAM(s) IV Push every 4 hours PRN Breakthrough pain  magnesium hydroxide Suspension 30 milliLiter(s) Oral daily PRN Constipation  ondansetron Injectable 4 milliGRAM(s) IV Push every 6 hours PRN Nausea and/or Vomiting  oxyCODONE    IR 5 milliGRAM(s) Oral every 4 hours PRN Moderate Pain (4 - 6)  oxyCODONE    IR 10 milliGRAM(s) Oral every 4 hours PRN Severe Pain (7 - 10)

## 2024-02-23 NOTE — DISCHARGE NOTE PROVIDER - NSDCMRMEDTOKEN_GEN_ALL_CORE_FT
vitamin D3  daily:    acetaminophen 500 mg oral tablet: 2 tab(s) orally every 8 hours  aspirin 81 mg oral tablet, chewable: 1 tab(s) orally 2 times a day  celecoxib 200 mg oral capsule: 1 cap(s) orally every 12 hours  oxyCODONE 5 mg oral tablet: 1 tab(s) orally every 4 hours As needed Moderate Pain (4 - 6)  pantoprazole 40 mg oral delayed release tablet: 1 tab(s) orally once a day (before a meal)  polyethylene glycol 3350 oral powder for reconstitution: 17 gram(s) orally once a day (at bedtime)  senna leaf extract oral tablet: 2 tab(s) orally once a day (at bedtime)  vitamin D3  daily:    acetaminophen 500 mg oral tablet: 2 tab(s) orally every 8 hours  aspirin 81 mg oral tablet, chewable: 1 tab(s) orally 2 times a day for 30 days after surgery  celecoxib 200 mg oral capsule: 1 cap(s) orally every 12 hours  oxyCODONE 5 mg oral tablet: 1 tab(s) orally every 4 hours As needed Moderate Pain (4 - 6)  pantoprazole 40 mg oral delayed release tablet: 1 tab(s) orally once a day (before a meal)  polyethylene glycol 3350 oral powder for reconstitution: 17 gram(s) orally once a day (at bedtime)  senna leaf extract oral tablet: 2 tab(s) orally once a day (at bedtime)  vitamin D3  daily:

## 2024-02-23 NOTE — PHYSICAL THERAPY INITIAL EVALUATION ADULT - PERTINENT HX OF CURRENT PROBLEM, REHAB EVAL
Patient is 63M s/p Left tibia BUTCH November 2023 with left knee pain worsened over time without improvement. Failed conservative treatments. Denies numbness, tingling. Ambulates without assist. Presents  admitted  for elective left total knee replacement.

## 2024-02-23 NOTE — PHYSICAL THERAPY INITIAL EVALUATION ADULT - GENERAL OBSERVATIONS, REHAB EVAL
Patient cleared by ISMAEL Rubio for PT.  Patient received supine in bed in NAD, heplock, bilateral SCDs, , Left knee  cryocuff in place,

## 2024-02-23 NOTE — DISCHARGE NOTE PROVIDER - HOSPITAL COURSE
Admitted to 2/22/24 to [facility] orthopedics  Surgery 2/22/24 Left TKA  Eliana-op Antibiotics  Pain control  DVT prophylaxis  OOB/Physical Therapy  Medical comanagement Admitted to 2/22/24 to [facility] orthopedics  Surgery 2/22/24 Left Total knee replacement  Eliana-op Antibiotics  Pain control  DVT prophylaxis  OOB/Physical Therapy  Medical comanagement

## 2024-02-23 NOTE — PHYSICAL THERAPY INITIAL EVALUATION ADULT - ADDITIONAL COMMENTS
Patient reports living in private home with 2 roommates, he also reports having a  shared bathroom, endorsed having to negotiate 2 steps at entrance with handrail x 1.  Prior to admission, patient was independent in  ambulating  outdoors using a "stick", pt stated that he recently lost his straight cane  and was using a "stick "

## 2024-02-23 NOTE — PROGRESS NOTE ADULT - SUBJECTIVE AND OBJECTIVE BOX
POST OPERATIVE DAY #: 1  STATUS POST: Left    Right Left TKA                    SUBJECTIVE: Patient seen and examined. He is comfortable without any complaints, and states his pain is well controlled. Patient states he has not passed gas or gone to the bathroom since surgery, although he denies stomach pain. He claims to have been walking with a walker around the room. Denies any painful urination, urine retention, sob, cp, n/v, numbness or tingling in lower extremity b/l.     OBJECTIVE:     Vital Signs Last 24 Hrs  T(C): 36.3 (23 Feb 2024 05:00), Max: 36.7 (22 Feb 2024 17:40)  T(F): 97.3 (23 Feb 2024 05:00), Max: 98.1 (22 Feb 2024 21:50)  HR: 62 (23 Feb 2024 05:00) (60 - 94)  BP: 144/76 (23 Feb 2024 05:00) (96/55 - 147/76)  BP(mean): 100 (22 Feb 2024 20:40) (71 - 100)  RR: 17 (23 Feb 2024 05:00) (16 - 40)  SpO2: 97% (23 Feb 2024 05:00) (92% - 99%)    Parameters below as of 23 Feb 2024 05:00  Patient On (Oxygen Delivery Method): room air        General: Patient is A&OX4, NAD  Affected extremity: Left LE   Dressing: clean/dry/intact   Sensation: intact to light touch to patient's baseline  Motor exam: EHL/TA/GS    Pulses             I&O's Detail    22 Feb 2024 07:01  -  23 Feb 2024 07:00  --------------------------------------------------------  IN:    Lactated Ringers: 300 mL  Total IN: 300 mL    OUT:    Voided (mL): 625 mL  Total OUT: 625 mL    Total NET: -325 mL          LABS:                        12.5   17.93 )-----------( 248      ( 23 Feb 2024 05:30 )             36.8     02-23    137  |  103  |  20  ----------------------------<  120<H>  4.8   |  26  |  0.79    Ca    8.9      23 Feb 2024 05:30        Urinalysis Basic - ( 23 Feb 2024 05:30 )    Color: x / Appearance: x / SG: x / pH: x  Gluc: 120 mg/dL / Ketone: x  / Bili: x / Urobili: x   Blood: x / Protein: x / Nitrite: x   Leuk Esterase: x / RBC: x / WBC x   Sq Epi: x / Non Sq Epi: x / Bacteria: x        MEDICATIONS:    acetaminophen     Tablet .. 1000 milliGRAM(s) Oral every 8 hours  celecoxib 200 milliGRAM(s) Oral every 12 hours  HYDROmorphone  Injectable 0.5 milliGRAM(s) IV Push every 4 hours PRN  HYDROmorphone  Injectable 0.5 milliGRAM(s) IV Push every 15 minutes  ketorolac   Injectable 15 milliGRAM(s) IV Push every 6 hours  ondansetron Injectable 4 milliGRAM(s) IV Push every 6 hours PRN  oxyCODONE    IR 5 milliGRAM(s) Oral every 4 hours PRN  oxyCODONE    IR 10 milliGRAM(s) Oral every 4 hours PRN    aspirin  chewable 81 milliGRAM(s) Oral two times a day        ASSESSMENT AND PLAN: 62yo Male s/p     1. Analgesic pain control  2. DVT prophylaxis: ASA      HSQ       Coumadin      Lovenox      SCDs      Other:   3. Weight Bearing Status:  Weight bearing as tolerated       NWB         Partial Weight Bearing  4. Disposition: Home          Subacute Rehab      pending PT evaluation POST OPERATIVE DAY #: 1  STATUS POST: Left    Right Left TKA                    SUBJECTIVE: Patient seen and examined. He is comfortable without any complaints, and states his pain is well controlled. Patient states he has not passed gas or gone to the bathroom since surgery, although he denies stomach pain. He claims to have been walking with a walker around the room. Denies any painful urination, urine retention, sob, cp, n/v, numbness or tingling in lower extremity b/l.     OBJECTIVE:     Vital Signs Last 24 Hrs  T(C): 36.3 (23 Feb 2024 05:00), Max: 36.7 (22 Feb 2024 17:40)  T(F): 97.3 (23 Feb 2024 05:00), Max: 98.1 (22 Feb 2024 21:50)  HR: 62 (23 Feb 2024 05:00) (60 - 94)  BP: 144/76 (23 Feb 2024 05:00) (96/55 - 147/76)  BP(mean): 100 (22 Feb 2024 20:40) (71 - 100)  RR: 17 (23 Feb 2024 05:00) (16 - 40)  SpO2: 97% (23 Feb 2024 05:00) (92% - 99%)    Parameters below as of 23 Feb 2024 05:00  Patient On (Oxygen Delivery Method): room air        General: Patient is A&OX4, NAD  Dressing: Left LE - incision sites covered with Aquacel gauze and Tegaderm. NAIF stocking. Dressings are clean/dry/intact. Use fo Cyrocuff on Left knee.   Sensation: SILT distal b/l lower extremity  Motor exam: EHL/TA/GS 5/5    Pulses: DP 2+             I&O's Detail    22 Feb 2024 07:01  -  23 Feb 2024 07:00  --------------------------------------------------------  IN:    Lactated Ringers: 300 mL  Total IN: 300 mL    OUT:    Voided (mL): 625 mL  Total OUT: 625 mL    Total NET: -325 mL          LABS:                        12.5   17.93 )-----------( 248      ( 23 Feb 2024 05:30 )             36.8     02-23    137  |  103  |  20  ----------------------------<  120<H>  4.8   |  26  |  0.79    Ca    8.9      23 Feb 2024 05:30        Urinalysis Basic - ( 23 Feb 2024 05:30 )    Color: x / Appearance: x / SG: x / pH: x  Gluc: 120 mg/dL / Ketone: x  / Bili: x / Urobili: x   Blood: x / Protein: x / Nitrite: x   Leuk Esterase: x / RBC: x / WBC x   Sq Epi: x / Non Sq Epi: x / Bacteria: x        MEDICATIONS:    acetaminophen     Tablet .. 1000 milliGRAM(s) Oral every 8 hours  celecoxib 200 milliGRAM(s) Oral every 12 hours  HYDROmorphone  Injectable 0.5 milliGRAM(s) IV Push every 4 hours PRN  HYDROmorphone  Injectable 0.5 milliGRAM(s) IV Push every 15 minutes  ketorolac   Injectable 15 milliGRAM(s) IV Push every 6 hours  ondansetron Injectable 4 milliGRAM(s) IV Push every 6 hours PRN  oxyCODONE    IR 5 milliGRAM(s) Oral every 4 hours PRN  oxyCODONE    IR 10 milliGRAM(s) Oral every 4 hours PRN    aspirin  chewable 81 milliGRAM(s) Oral two times a day        ASSESSMENT AND PLAN: 64yo Male POD#1 s/p L TKA    1. Analgesic pain control: Pain controlled with Acetominophen 1000mg q8 hrs, Oxycodone for Moderate  Dilaudid PRN for breakthrough pain  2. DVT prophylaxis: ASA 81mg BID  3. Weight Bearing Status:  Weight bearing as tolerated, ambulating well with walker use.   4. Constipation: Follow up with patient on flatulence and abdominal pain - Bisacodyl suppository 10mg and Magnesium hydroxide 30ml PO daily ordered, encourage ambulation      4. Disposition:  Pending PT evaluation - pt. prefers [facility] POST OPERATIVE DAY #: 1  STATUS POST: Left    Right Left TKA                    SUBJECTIVE: Patient seen and examined. He is comfortable without any complaints, and states his pain is well controlled. Patient states he has not passed gas or gone to the bathroom since surgery, although he denies stomach pain. He claims to have been walking with a walker around the room. Denies any painful urination, urine retention, sob, cp, n/v, numbness or tingling in lower extremity b/l.     OBJECTIVE:     Vital Signs Last 24 Hrs  T(C): 36.3 (23 Feb 2024 05:00), Max: 36.7 (22 Feb 2024 17:40)  T(F): 97.3 (23 Feb 2024 05:00), Max: 98.1 (22 Feb 2024 21:50)  HR: 62 (23 Feb 2024 05:00) (60 - 94)  BP: 144/76 (23 Feb 2024 05:00) (96/55 - 147/76)  BP(mean): 100 (22 Feb 2024 20:40) (71 - 100)  RR: 17 (23 Feb 2024 05:00) (16 - 40)  SpO2: 97% (23 Feb 2024 05:00) (92% - 99%)    Parameters below as of 23 Feb 2024 05:00  Patient On (Oxygen Delivery Method): room air        General: Patient is A&OX4, NAD  Dressing: Left LE - incision sites covered with Aquacel gauze and Tegaderm. NAIF stocking. Dressings are clean/dry/intact. Use fo Cyrocuff on Left knee.   Sensation: SILT distal b/l lower extremity  Motor exam: EHL/TA/GS 5/5    Pulses: DP 2+             I&O's Detail    22 Feb 2024 07:01  -  23 Feb 2024 07:00  --------------------------------------------------------  IN:    Lactated Ringers: 300 mL  Total IN: 300 mL    OUT:    Voided (mL): 625 mL  Total OUT: 625 mL    Total NET: -325 mL          LABS:                        12.5   17.93 )-----------( 248      ( 23 Feb 2024 05:30 )             36.8     02-23    137  |  103  |  20  ----------------------------<  120<H>  4.8   |  26  |  0.79    Ca    8.9      23 Feb 2024 05:30        Urinalysis Basic - ( 23 Feb 2024 05:30 )    Color: x / Appearance: x / SG: x / pH: x  Gluc: 120 mg/dL / Ketone: x  / Bili: x / Urobili: x   Blood: x / Protein: x / Nitrite: x   Leuk Esterase: x / RBC: x / WBC x   Sq Epi: x / Non Sq Epi: x / Bacteria: x        MEDICATIONS:    acetaminophen     Tablet .. 1000 milliGRAM(s) Oral every 8 hours  celecoxib 200 milliGRAM(s) Oral every 12 hours  HYDROmorphone  Injectable 0.5 milliGRAM(s) IV Push every 4 hours PRN  HYDROmorphone  Injectable 0.5 milliGRAM(s) IV Push every 15 minutes  ketorolac   Injectable 15 milliGRAM(s) IV Push every 6 hours  ondansetron Injectable 4 milliGRAM(s) IV Push every 6 hours PRN  oxyCODONE    IR 5 milliGRAM(s) Oral every 4 hours PRN  oxyCODONE    IR 10 milliGRAM(s) Oral every 4 hours PRN    aspirin  chewable 81 milliGRAM(s) Oral two times a day        ASSESSMENT AND PLAN: 62yo Male POD#1 s/p L TKA    1. Analgesic pain control: Pain controlled with Acetominophen 1000mg q8 hrs, Oxycodone for Moderate - Severe Pain and Dilaudid PRN for breakthrough pain  2. DVT prophylaxis: ASA 81mg BID  3. Weight Bearing Status:  Weight bearing as tolerated, ambulating well with walker use.   4. Constipation: Follow up with patient on flatulence and abdominal pain - Bisacodyl suppository 10mg and Magnesium hydroxide 30ml PO daily ordered, encourage ambulation      4. Disposition:  Pending PT evaluation - pt. prefers [facility] POST OPERATIVE DAY #: 1  STATUS POST: Left    Right Left TKA                    SUBJECTIVE: Patient seen and examined. He is comfortable without any complaints, and states his pain is well controlled. Patient states he has not passed gas or gone to the bathroom since surgery, although he denies stomach pain. He claims to have been walking with a walker around the room. Denies any painful urination, urine retention, sob, cp, n/v, numbness or tingling in lower extremity b/l.     OBJECTIVE:     Vital Signs Last 24 Hrs  T(C): 36.3 (23 Feb 2024 05:00), Max: 36.7 (22 Feb 2024 17:40)  T(F): 97.3 (23 Feb 2024 05:00), Max: 98.1 (22 Feb 2024 21:50)  HR: 62 (23 Feb 2024 05:00) (60 - 94)  BP: 144/76 (23 Feb 2024 05:00) (96/55 - 147/76)  BP(mean): 100 (22 Feb 2024 20:40) (71 - 100)  RR: 17 (23 Feb 2024 05:00) (16 - 40)  SpO2: 97% (23 Feb 2024 05:00) (92% - 99%)    Parameters below as of 23 Feb 2024 05:00  Patient On (Oxygen Delivery Method): room air        General: Patient is A&OX4, NAD  Dressing: Left LE - incision sites covered with Aquacel gauze and Tegaderm. NAIF stocking. Dressings are clean/dry/intact. Use of Cyrocuff on Left knee.   Sensation: SILT distal b/l lower extremity  Motor exam: EHL/TA/GS 5/5    Pulses: DP 2+             I&O's Detail    22 Feb 2024 07:01  -  23 Feb 2024 07:00  --------------------------------------------------------  IN:    Lactated Ringers: 300 mL  Total IN: 300 mL    OUT:    Voided (mL): 625 mL  Total OUT: 625 mL    Total NET: -325 mL          LABS:                        12.5   17.93 )-----------( 248      ( 23 Feb 2024 05:30 )             36.8     02-23    137  |  103  |  20  ----------------------------<  120<H>  4.8   |  26  |  0.79    Ca    8.9      23 Feb 2024 05:30        Urinalysis Basic - ( 23 Feb 2024 05:30 )    Color: x / Appearance: x / SG: x / pH: x  Gluc: 120 mg/dL / Ketone: x  / Bili: x / Urobili: x   Blood: x / Protein: x / Nitrite: x   Leuk Esterase: x / RBC: x / WBC x   Sq Epi: x / Non Sq Epi: x / Bacteria: x        MEDICATIONS:    acetaminophen     Tablet .. 1000 milliGRAM(s) Oral every 8 hours  celecoxib 200 milliGRAM(s) Oral every 12 hours  HYDROmorphone  Injectable 0.5 milliGRAM(s) IV Push every 4 hours PRN  HYDROmorphone  Injectable 0.5 milliGRAM(s) IV Push every 15 minutes  ketorolac   Injectable 15 milliGRAM(s) IV Push every 6 hours  ondansetron Injectable 4 milliGRAM(s) IV Push every 6 hours PRN  oxyCODONE    IR 5 milliGRAM(s) Oral every 4 hours PRN  oxyCODONE    IR 10 milliGRAM(s) Oral every 4 hours PRN    aspirin  chewable 81 milliGRAM(s) Oral two times a day        ASSESSMENT AND PLAN: 64yo Male POD#1 s/p L TKA    1. Analgesic pain control: Pain controlled with Acetominophen 1000mg q8 hrs, Oxycodone for Moderate - Severe Pain and Dilaudid PRN for breakthrough pain  2. DVT prophylaxis: ASA 81mg BID  3. Weight Bearing Status:  Weight bearing as tolerated, ambulating well with walker use.   4. Constipation: Follow up with patient on flatulence and abdominal pain - Bisacodyl suppository 10mg and Magnesium hydroxide 30ml PO daily ordered, encourage ambulation      4. Disposition:  Pending PT evaluation - pt. prefers [facility] POST OPERATIVE DAY #: 1  STATUS POST: Left    Right Left TKA                    SUBJECTIVE: Patient seen and examined. He is comfortable without any complaints, and states his pain is well controlled. Patient states he has not passed gas and no BM yet, although he denies stomach pain. He claims to have been walking with a walker around the room. Denies any painful urination, urine retention, sob, cp, n/v, numbness or tingling in lower extremity b/l.       OBJECTIVE:     Vital Signs Last 24 Hrs  T(C): 36.3 (23 Feb 2024 05:00), Max: 36.7 (22 Feb 2024 17:40)  T(F): 97.3 (23 Feb 2024 05:00), Max: 98.1 (22 Feb 2024 21:50)  HR: 62 (23 Feb 2024 05:00) (60 - 94)  BP: 144/76 (23 Feb 2024 05:00) (96/55 - 147/76)  BP(mean): 100 (22 Feb 2024 20:40) (71 - 100)  RR: 17 (23 Feb 2024 05:00) (16 - 40)  SpO2: 97% (23 Feb 2024 05:00) (92% - 99%)    Parameters below as of 23 Feb 2024 05:00  Patient On (Oxygen Delivery Method): room air        General: Patient is A&OX4, NAD  Dressing: Left LE - Aquacel gauze and Tegaderm. NAIF stocking. Dressings are clean/dry/intact. Overlying Cyrocuff on Left knee.   Sensation: SILT distal b/l lower extremity  Motor exam: EHL/TA/GS 5/5    Pulses: 2+ DP pulses palpable bilaterally, skin wwp, cap refill brisk             I&O's Detail    22 Feb 2024 07:01  -  23 Feb 2024 07:00  --------------------------------------------------------  IN:    Lactated Ringers: 300 mL  Total IN: 300 mL    OUT:    Voided (mL): 625 mL  Total OUT: 625 mL    Total NET: -325 mL          LABS:                        12.5   17.93 )-----------( 248      ( 23 Feb 2024 05:30 )             36.8     02-23    137  |  103  |  20  ----------------------------<  120<H>  4.8   |  26  |  0.79    Ca    8.9      23 Feb 2024 05:30        Urinalysis Basic - ( 23 Feb 2024 05:30 )    Color: x / Appearance: x / SG: x / pH: x  Gluc: 120 mg/dL / Ketone: x  / Bili: x / Urobili: x   Blood: x / Protein: x / Nitrite: x   Leuk Esterase: x / RBC: x / WBC x   Sq Epi: x / Non Sq Epi: x / Bacteria: x        MEDICATIONS:    acetaminophen     Tablet .. 1000 milliGRAM(s) Oral every 8 hours  celecoxib 200 milliGRAM(s) Oral every 12 hours  HYDROmorphone  Injectable 0.5 milliGRAM(s) IV Push every 4 hours PRN  HYDROmorphone  Injectable 0.5 milliGRAM(s) IV Push every 15 minutes  ketorolac   Injectable 15 milliGRAM(s) IV Push every 6 hours  ondansetron Injectable 4 milliGRAM(s) IV Push every 6 hours PRN  oxyCODONE    IR 5 milliGRAM(s) Oral every 4 hours PRN  oxyCODONE    IR 10 milliGRAM(s) Oral every 4 hours PRN    aspirin  chewable 81 milliGRAM(s) Oral two times a day        ASSESSMENT AND PLAN: 64yo Male POD#1 s/p L TKA    1. Analgesic pain control: Pain controlled with Acetominophen 1000mg q8 hrs, Oxycodone for Moderate - Severe Pain and Dilaudid PRN for breakthrough pain  2. DVT prophylaxis: ASA 81mg BID  3. Weight Bearing Status:  Weight bearing as tolerated  4. OOB/IS  5. Bowel Regimen on board  6. Disposition:  Pending PT evaluation - pt. prefers [facility]              I examined this patient with Kristen HWANG and agree with above exam and plan. I received a call from  regarding patient having aggressive behaviour upon dispo discussion. CM reported patient was ambulating around the room, throwing his home clothes onto the bed, and speaking aggressively. I went to bedside to speak with the patient and explained that we take his preferences into consideration but that he still needs to be evaluated by physical therapy for proper dispo planning which includes seeing what his insurance will cover. Patient states he would "rather go out onto the street" than not go to his rehab choice and is feeling very frustrated. I again reiterated that typical postoperative procedure requires an initial physical therapy evaluation and that we will do our best to align our goals.     Noni Fernandez PA-C

## 2024-02-23 NOTE — DISCHARGE NOTE PROVIDER - NSDCHHCONTACT_GEN_ALL_CORE_FT
"Interval History: see "Hospital Course"    Review of Systems   Respiratory:  Positive for shortness of breath.    Objective:     Vital Signs (Most Recent):  Temp: 98 °F (36.7 °C) (03/11/23 1930)  Pulse: 79 (03/11/23 1930)  Resp: 18 (03/11/23 1930)  BP: (!) 145/83 (03/11/23 1930)  SpO2: 97 % (03/11/23 1930)   Vital Signs (24h Range):  Temp:  [97.5 °F (36.4 °C)-98.6 °F (37 °C)] 98 °F (36.7 °C)  Pulse:  [78-87] 79  Resp:  [18] 18  SpO2:  [95 %-97 %] 97 %  BP: (111-145)/(45-83) 145/83     Weight: 135.9 kg (299 lb 9.6 oz)  Body mass index is 48.36 kg/m².    Intake/Output Summary (Last 24 hours) at 3/11/2023 2136  Last data filed at 3/11/2023 1829  Gross per 24 hour   Intake 750 ml   Output --   Net 750 ml      Physical Exam  Vitals and nursing note reviewed.   Constitutional:       General: She is not in acute distress.     Appearance: She is obese.   HENT:      Head: Normocephalic and atraumatic.      Right Ear: External ear normal.      Left Ear: External ear normal.      Nose: Nose normal.      Mouth/Throat:      Mouth: Mucous membranes are moist.      Pharynx: Oropharynx is clear.   Eyes:      Extraocular Movements: Extraocular movements intact.      Conjunctiva/sclera: Conjunctivae normal.   Cardiovascular:      Rate and Rhythm: Normal rate and regular rhythm.      Pulses: Normal pulses.      Heart sounds: Normal heart sounds.      Comments: LUE AV fistula.  Pulmonary:      Effort: Pulmonary effort is normal. No respiratory distress.      Breath sounds: Normal breath sounds.   Abdominal:      General: There is no distension.      Palpations: Abdomen is soft.      Tenderness: There is no abdominal tenderness.   Musculoskeletal:         General: Normal range of motion.      Cervical back: Normal range of motion and neck supple.      Right lower leg: No edema.      Left lower leg: No edema.   Skin:     General: Skin is warm and dry.   Neurological:      General: No focal deficit present.      Mental Status: She is " oriented to person, place, and time. Mental status is at baseline.   Psychiatric:         Mood and Affect: Mood normal.         Behavior: Behavior normal.       Significant Labs: All pertinent labs within the past 24 hours have been reviewed.    Significant Imaging: I have reviewed all pertinent imaging results/findings within the past 24 hours.   As certified below, I, or a nurse practitioner or physician assistant working with me, had a face-to-face encounter that meets the physician face-to-face encounter requirements.

## 2024-02-23 NOTE — PHYSICAL THERAPY INITIAL EVALUATION ADULT - NSPTDISCHREC_GEN_A_CORE
CM made aware, RN was made aware/Sub-acute Rehab REBECCA Evans made aware, RN was made aware/Sub-acute Rehab

## 2024-02-23 NOTE — PHYSICAL THERAPY INITIAL EVALUATION ADULT - MANUAL MUSCLE TESTING RESULTS, REHAB EVAL
except L knee 3/5/no strength deficits were identified except L knee at least 3/5; no resistance given due to surgery/no strength deficits were identified

## 2024-02-23 NOTE — DISCHARGE NOTE PROVIDER - NSDCFUADDINST_GEN_ALL_CORE_FT
Please follow Dr. Sosa’s instruction sheets   ACTIVITY:   - Weight bear as tolerated with assistive device. No strenuous activity, heavy lifting, driving or returning to work until cleared by MD.   - Apply a cold compress to the surgical site several times daily to reduce pain and swelling. For icing, twenty-minute sessions followed by an hour off is recommended. You should ice as frequently as possible. Ice should NEVER be placed directly on the skin. Wearing compression stockings during the first week after surgery can help reduce swelling in your knee, calf and foot, but is not required.      (KNEE REPLACEMENTS)   DO place a pillow under your heel when elevating the leg, to encourage full extension of the knee. Do NOT place a pillow behind your knee for comfort, as this can lead to permanent difficulty straightening your leg. It is normal to develop some swelling in the leg, ankle, and foot because of gravity.     DRESSING/SHOWERING:   (AQUACEL – brown gel dressing)   - You may shower, your dressing is water-resistant. Do not soak in bathtubs. Remove dressing after postop day 7, then leave incision open to air. You may do this yourself (simply peel it off), or you may ask for assistance from your visiting nurse. Keep your incision clean and dry. Do not pick at your incision. Do not apply creams, ointments or oils to your incision until cleared by your surgeon. Do not soak your incision in sitting water (ie tubs, pools, lakes, etc.) until cleared by your surgeon. Do not scrub the incision – instead, allow soap and water to flow over the incision and then pat it dry with a clean towel.     MEDICATION/ANTICOAGULATION:   -You have been prescribed Aspirin, as a preventative to help prevent postoperative blood clots. Please take this medication as prescribed.    - You have been prescribed medications for pain:     - Tylenol for mild to moderate pain. Do not exceed 3,000mg daily.     - For more severe pain, take Tylenol with the addition of narcotic pain medication. Take this medication as prescribed. This medication may cause drowsiness or dizziness. Do not operate machinery. This medication may cause constipation.   - For any additional medications, follow instructions on the bottle.    -Try to have regular bowel movements. Take stool softener or laxative if necessary. You may wish to take Miralax daily until you have regular bowel movements.    - If you have been prescribed Aspirin or an anti-inflammatory, please take prilosec (omeprazole) once a day, before breakfast, until no longer taking Aspirin or anti-inflammatory. This will help protect your stomach.   - If you have a pain management physician, please follow-up with them postoperatively.    - If you experience any negative side effects of your medications, please call your surgeon's office to discuss.      FOLLOW-UP:   - You have an appointment scheduled with Dr. Sosa on 3/8/24. Please call his office to confirm your appointment.   - Please follow-up with your primary care physician or any other specialist you see postoperatively, if needed.    - Contact your doctor or go to the emergency room if you experience: fever greater than 101.5, chills, chest pain, difficulty breathing, redness or excessive drainage around the incision, other concerns.   PLEASE REFER TO DR. JULIEN'S POST-OPERATIVE DISCHARGE INSTRUCTIONS SHEET FOR FURTHER INFORMATION ON DRESSING/POST-OP CARE.  ACTIVITY:   - Weight bear as tolerated with assistive device. No strenuous activity, heavy lifting, driving or returning to work until cleared by MD.   - Apply a cold compress to the surgical site several times daily to reduce pain and swelling. For icing, twenty-minute sessions followed by an hour off is recommended. You should ice as frequently as possible. Ice should NEVER be placed directly on the skin. Wearing compression stockings during the first week after surgery can help reduce swelling in your knee, calf and foot, but is not required.      (KNEE REPLACEMENTS)   DO place a pillow under your heel when elevating the leg, to encourage full extension of the knee. Do NOT place a pillow behind your knee for comfort, as this can lead to permanent difficulty straightening your leg. It is normal to develop some swelling in the leg, ankle, and foot because of gravity.     DRESSING/SHOWERING:   (AQUACEL – brown gel dressing) - PLEASE REFER TO DR. JULIEN'S INSTRUCTION SHEET.   - You may shower, your dressing is water-resistant.     MEDICATION/ANTICOAGULATION:   -You have been prescribed Aspirin, as a preventative to help prevent postoperative blood clots. Please take this medication as prescribed.    - You have been prescribed medications for pain:     - Tylenol for mild to moderate pain. Do not exceed 3,000mg daily.     - For more severe pain, take Tylenol with the addition of narcotic pain medication. Take this medication as prescribed. This medication may cause drowsiness or dizziness. Do not operate machinery. This medication may cause constipation.   - For any additional medications, follow instructions on the bottle.    -Try to have regular bowel movements. Take stool softener or laxative if necessary. You may wish to take Miralax daily until you have regular bowel movements.    - If you have been prescribed Aspirin or an anti-inflammatory, please take prilosec (omeprazole) once a day, before breakfast, until no longer taking Aspirin or anti-inflammatory. This will help protect your stomach.   - If you have a pain management physician, please follow-up with them postoperatively.    - If you experience any negative side effects of your medications, please call your surgeon's office to discuss.      FOLLOW-UP:   - You have an appointment scheduled with Dr. Julien on 3/8/24. Please call his office to confirm your appointment.   - Please follow-up with your primary care physician or any other specialist you see postoperatively, if needed.    - Contact your doctor or go to the emergency room if you experience: fever greater than 101.5, chills, chest pain, difficulty breathing, redness or excessive drainage around the incision, other concerns.

## 2024-02-24 LAB
ANION GAP SERPL CALC-SCNC: 10 MMOL/L — SIGNIFICANT CHANGE UP (ref 5–17)
BUN SERPL-MCNC: 18 MG/DL — SIGNIFICANT CHANGE UP (ref 7–23)
CALCIUM SERPL-MCNC: 8.6 MG/DL — SIGNIFICANT CHANGE UP (ref 8.4–10.5)
CHLORIDE SERPL-SCNC: 106 MMOL/L — SIGNIFICANT CHANGE UP (ref 96–108)
CO2 SERPL-SCNC: 25 MMOL/L — SIGNIFICANT CHANGE UP (ref 22–31)
CREAT SERPL-MCNC: 0.93 MG/DL — SIGNIFICANT CHANGE UP (ref 0.5–1.3)
EGFR: 93 ML/MIN/1.73M2 — SIGNIFICANT CHANGE UP
GLUCOSE SERPL-MCNC: 88 MG/DL — SIGNIFICANT CHANGE UP (ref 70–99)
HCT VFR BLD CALC: 34 % — LOW (ref 39–50)
HGB BLD-MCNC: 11.1 G/DL — LOW (ref 13–17)
MCHC RBC-ENTMCNC: 29.1 PG — SIGNIFICANT CHANGE UP (ref 27–34)
MCHC RBC-ENTMCNC: 32.6 GM/DL — SIGNIFICANT CHANGE UP (ref 32–36)
MCV RBC AUTO: 89.2 FL — SIGNIFICANT CHANGE UP (ref 80–100)
NRBC # BLD: 0 /100 WBCS — SIGNIFICANT CHANGE UP (ref 0–0)
PLATELET # BLD AUTO: 209 K/UL — SIGNIFICANT CHANGE UP (ref 150–400)
POTASSIUM SERPL-MCNC: 4.7 MMOL/L — SIGNIFICANT CHANGE UP (ref 3.5–5.3)
POTASSIUM SERPL-SCNC: 4.7 MMOL/L — SIGNIFICANT CHANGE UP (ref 3.5–5.3)
RBC # BLD: 3.81 M/UL — LOW (ref 4.2–5.8)
RBC # FLD: 12.9 % — SIGNIFICANT CHANGE UP (ref 10.3–14.5)
SODIUM SERPL-SCNC: 141 MMOL/L — SIGNIFICANT CHANGE UP (ref 135–145)
WBC # BLD: 9.98 K/UL — SIGNIFICANT CHANGE UP (ref 3.8–10.5)
WBC # FLD AUTO: 9.98 K/UL — SIGNIFICANT CHANGE UP (ref 3.8–10.5)

## 2024-02-24 PROCEDURE — 99232 SBSQ HOSP IP/OBS MODERATE 35: CPT

## 2024-02-24 RX ORDER — ASPIRIN/CALCIUM CARB/MAGNESIUM 324 MG
1 TABLET ORAL
Qty: 0 | Refills: 0 | DISCHARGE
Start: 2024-02-24

## 2024-02-24 RX ORDER — SENNA PLUS 8.6 MG/1
2 TABLET ORAL
Qty: 0 | Refills: 0 | DISCHARGE
Start: 2024-02-24

## 2024-02-24 RX ORDER — CELECOXIB 200 MG/1
1 CAPSULE ORAL
Qty: 0 | Refills: 0 | DISCHARGE
Start: 2024-02-24

## 2024-02-24 RX ORDER — ACETAMINOPHEN 500 MG
2 TABLET ORAL
Qty: 0 | Refills: 0 | DISCHARGE
Start: 2024-02-24

## 2024-02-24 RX ORDER — POLYETHYLENE GLYCOL 3350 17 G/17G
17 POWDER, FOR SOLUTION ORAL
Qty: 0 | Refills: 0 | DISCHARGE
Start: 2024-02-24

## 2024-02-24 RX ORDER — OXYCODONE HYDROCHLORIDE 5 MG/1
1 TABLET ORAL
Qty: 0 | Refills: 0 | DISCHARGE
Start: 2024-02-24

## 2024-02-24 RX ORDER — PANTOPRAZOLE SODIUM 20 MG/1
1 TABLET, DELAYED RELEASE ORAL
Qty: 0 | Refills: 0 | DISCHARGE
Start: 2024-02-24

## 2024-02-24 RX ADMIN — Medication 81 MILLIGRAM(S): at 17:24

## 2024-02-24 RX ADMIN — Medication 81 MILLIGRAM(S): at 05:19

## 2024-02-24 RX ADMIN — CELECOXIB 200 MILLIGRAM(S): 200 CAPSULE ORAL at 05:19

## 2024-02-24 RX ADMIN — CELECOXIB 200 MILLIGRAM(S): 200 CAPSULE ORAL at 18:48

## 2024-02-24 RX ADMIN — Medication 1000 MILLIGRAM(S): at 05:19

## 2024-02-24 RX ADMIN — OXYCODONE HYDROCHLORIDE 5 MILLIGRAM(S): 5 TABLET ORAL at 18:48

## 2024-02-24 RX ADMIN — PANTOPRAZOLE SODIUM 40 MILLIGRAM(S): 20 TABLET, DELAYED RELEASE ORAL at 05:40

## 2024-02-24 RX ADMIN — Medication 1000 MILLIGRAM(S): at 21:50

## 2024-02-24 RX ADMIN — CELECOXIB 200 MILLIGRAM(S): 200 CAPSULE ORAL at 17:25

## 2024-02-24 RX ADMIN — OXYCODONE HYDROCHLORIDE 5 MILLIGRAM(S): 5 TABLET ORAL at 17:25

## 2024-02-24 NOTE — PROGRESS NOTE ADULT - SUBJECTIVE AND OBJECTIVE BOX
Ortho Progress Note    Procedure: LEFT TKA   Surgeon: Dr. ISAAK Sosa    Pt comfortable without complaints, pain controlled  Denies CP, SOB, N/V, numbness/tingling     Vital Signs Last 24 Hrs  T(C): 36.6 (02-24-24 @ 05:03), Max: 36.6 (02-24-24 @ 05:03)  T(F): 97.8 (02-24-24 @ 05:03), Max: 97.8 (02-24-24 @ 05:03)  HR: 65 (02-24-24 @ 05:03) (65 - 65)  BP: 114/65 (02-24-24 @ 05:03) (114/65 - 114/65)  BP(mean): --  RR: 16 (02-24-24 @ 05:03) (16 - 16)  SpO2: 97% (02-24-24 @ 05:03) (97% - 97%)    General: Pt Alert and oriented, NAD  Prineo/Aquacel DSG C/D/I  Pulses: 2+ DP  Sensation: SILT grossly SPN/DPN/Saph/Neeta/Tib  Motor: 5/5 TA/GS/EHL, Quad/Psoas firing limited 2/2 pain    Post-op X-Ray: hardware intact w/o fracture    A/P: 63yMale s/p L TKA by Dr. ISAAK Sosa on 02-22  - Stable  - Pain Control  - DVT ppx: ASA 81 BID  - Post op abx: Ancef  - WBS: WBAT  - CODY

## 2024-02-24 NOTE — PROGRESS NOTE ADULT - SUBJECTIVE AND OBJECTIVE BOX
INTERVAL HPI/OVERNIGHT EVENTS:  Patient was seen and examined at bedside. As per nurse and patient, no o/n events, patient resting comfortably. No complaints at this time. Patient denies: fever, chills, dizziness, weakness, HA, Changes in vision, CP, palpitations, SOB, cough, N/V/D/C, dysuria, changes in bowel movements, LE edema. ROS otherwise negative.    VITAL SIGNS:  T(F): 98.1 (02-24-24 @ 16:01)  HR: 69 (02-24-24 @ 16:01)  BP: 131/81 (02-24-24 @ 16:01)  RR: 18 (02-24-24 @ 08:00)  SpO2: 95% (02-24-24 @ 16:01)  Wt(kg): --    PHYSICAL EXAM:    Constitutional: WDWN, NAD  HEENT: PERRL, EOMI, sclera non-icteric, neck supple, trachea midline, no masses, no JVD, MMM, good dentition  Respiratory: CTA b/l, good air entry b/l, no wheezing, no rhonchi, no rales, without accessory muscle use and no intercostal retractions  Cardiovascular: RRR, normal S1S2, no M/R/G  Gastrointestinal: soft, NTND, no masses palpable, BS normal  Extremities: Warm, well perfused, pulses equal bilateral upper and lower extremities, no edema, no clubbing  Neurological: AAOx3, CN Grossly intact  Skin: Normal temperature, warm, dry    MEDICATIONS  (STANDING):  acetaminophen     Tablet .. 1000 milliGRAM(s) Oral every 8 hours  aspirin  chewable 81 milliGRAM(s) Oral two times a day  celecoxib 200 milliGRAM(s) Oral every 12 hours  HYDROmorphone  Injectable 0.5 milliGRAM(s) IV Push every 15 minutes  lactated ringers. 1000 milliLiter(s) (100 mL/Hr) IV Continuous <Continuous>  pantoprazole    Tablet 40 milliGRAM(s) Oral before breakfast  polyethylene glycol 3350 17 Gram(s) Oral at bedtime  povidone iodine 5% Nasal Swab 1 Application(s) Both Nostrils once  senna 2 Tablet(s) Oral at bedtime    MEDICATIONS  (PRN):  bisacodyl Suppository 10 milliGRAM(s) Rectal once PRN Constipation  HYDROmorphone  Injectable 0.5 milliGRAM(s) IV Push every 4 hours PRN Breakthrough pain  magnesium hydroxide Suspension 30 milliLiter(s) Oral daily PRN Constipation  ondansetron Injectable 4 milliGRAM(s) IV Push every 6 hours PRN Nausea and/or Vomiting  oxyCODONE    IR 10 milliGRAM(s) Oral every 4 hours PRN Severe Pain (7 - 10)  oxyCODONE    IR 5 milliGRAM(s) Oral every 4 hours PRN Moderate Pain (4 - 6)      Allergies    No Known Allergies    Intolerances        LABS:                        11.1   9.98  )-----------( 209      ( 24 Feb 2024 05:30 )             34.0     02-24    141  |  106  |  18  ----------------------------<  88  4.7   |  25  |  0.93    Ca    8.6      24 Feb 2024 05:30        Urinalysis Basic - ( 24 Feb 2024 05:30 )    Color: x / Appearance: x / SG: x / pH: x  Gluc: 88 mg/dL / Ketone: x  / Bili: x / Urobili: x   Blood: x / Protein: x / Nitrite: x   Leuk Esterase: x / RBC: x / WBC x   Sq Epi: x / Non Sq Epi: x / Bacteria: x        RADIOLOGY & ADDITIONAL TESTS:  Reviewed

## 2024-02-25 LAB
ANION GAP SERPL CALC-SCNC: 10 MMOL/L — SIGNIFICANT CHANGE UP (ref 5–17)
BUN SERPL-MCNC: 17 MG/DL — SIGNIFICANT CHANGE UP (ref 7–23)
CALCIUM SERPL-MCNC: 9.1 MG/DL — SIGNIFICANT CHANGE UP (ref 8.4–10.5)
CHLORIDE SERPL-SCNC: 99 MMOL/L — SIGNIFICANT CHANGE UP (ref 96–108)
CO2 SERPL-SCNC: 28 MMOL/L — SIGNIFICANT CHANGE UP (ref 22–31)
CREAT SERPL-MCNC: 0.92 MG/DL — SIGNIFICANT CHANGE UP (ref 0.5–1.3)
EGFR: 94 ML/MIN/1.73M2 — SIGNIFICANT CHANGE UP
GLUCOSE SERPL-MCNC: 85 MG/DL — SIGNIFICANT CHANGE UP (ref 70–99)
HCT VFR BLD CALC: 35.6 % — LOW (ref 39–50)
HGB BLD-MCNC: 11.5 G/DL — LOW (ref 13–17)
MCHC RBC-ENTMCNC: 29 PG — SIGNIFICANT CHANGE UP (ref 27–34)
MCHC RBC-ENTMCNC: 32.3 GM/DL — SIGNIFICANT CHANGE UP (ref 32–36)
MCV RBC AUTO: 89.9 FL — SIGNIFICANT CHANGE UP (ref 80–100)
NRBC # BLD: 0 /100 WBCS — SIGNIFICANT CHANGE UP (ref 0–0)
PLATELET # BLD AUTO: 219 K/UL — SIGNIFICANT CHANGE UP (ref 150–400)
POTASSIUM SERPL-MCNC: 4.8 MMOL/L — SIGNIFICANT CHANGE UP (ref 3.5–5.3)
POTASSIUM SERPL-SCNC: 4.8 MMOL/L — SIGNIFICANT CHANGE UP (ref 3.5–5.3)
RBC # BLD: 3.96 M/UL — LOW (ref 4.2–5.8)
RBC # FLD: 12.8 % — SIGNIFICANT CHANGE UP (ref 10.3–14.5)
SODIUM SERPL-SCNC: 137 MMOL/L — SIGNIFICANT CHANGE UP (ref 135–145)
WBC # BLD: 8.23 K/UL — SIGNIFICANT CHANGE UP (ref 3.8–10.5)
WBC # FLD AUTO: 8.23 K/UL — SIGNIFICANT CHANGE UP (ref 3.8–10.5)

## 2024-02-25 PROCEDURE — 99232 SBSQ HOSP IP/OBS MODERATE 35: CPT

## 2024-02-25 RX ADMIN — Medication 1000 MILLIGRAM(S): at 05:30

## 2024-02-25 RX ADMIN — Medication 81 MILLIGRAM(S): at 17:56

## 2024-02-25 RX ADMIN — CELECOXIB 200 MILLIGRAM(S): 200 CAPSULE ORAL at 05:30

## 2024-02-25 RX ADMIN — Medication 1000 MILLIGRAM(S): at 21:08

## 2024-02-25 RX ADMIN — SENNA PLUS 2 TABLET(S): 8.6 TABLET ORAL at 21:09

## 2024-02-25 RX ADMIN — Medication 1000 MILLIGRAM(S): at 15:21

## 2024-02-25 RX ADMIN — PANTOPRAZOLE SODIUM 40 MILLIGRAM(S): 20 TABLET, DELAYED RELEASE ORAL at 05:30

## 2024-02-25 RX ADMIN — CELECOXIB 200 MILLIGRAM(S): 200 CAPSULE ORAL at 17:57

## 2024-02-25 RX ADMIN — Medication 1000 MILLIGRAM(S): at 22:08

## 2024-02-25 RX ADMIN — Medication 81 MILLIGRAM(S): at 05:30

## 2024-02-25 NOTE — PROGRESS NOTE ADULT - SUBJECTIVE AND OBJECTIVE BOX
Ortho Progress Note    Procedure: LEFT TKA   Surgeon: Dr. ISAAK Sosa    Pt comfortable without complaints, pain controlled  Denies CP, SOB, N/V, numbness/tingling     Vital Signs Last 24 Hrs  T(C): 36.6 (25 Feb 2024 05:02), Max: 36.8 (24 Feb 2024 20:34)  T(F): 97.8 (25 Feb 2024 05:02), Max: 98.2 (24 Feb 2024 20:34)  HR: 62 (25 Feb 2024 05:02) (62 - 78)  BP: 115/68 (25 Feb 2024 05:02) (110/65 - 132/72)  BP(mean): 80 (24 Feb 2024 20:34) (80 - 80)  RR: 16 (25 Feb 2024 05:02) (16 - 18)  SpO2: 97% (25 Feb 2024 05:02) (95% - 99%)    Parameters below as of 25 Feb 2024 05:02  Patient On (Oxygen Delivery Method): room air    General: Pt Alert and oriented, NAD  Prineo/Aquacel DSG C/D/I  Pulses: 2+ DP  Sensation: SILT grossly SPN/DPN/Saph/Neeta/Tib  Motor: 5/5 TA/GS/EHL, Quad/Psoas firing limited 2/2 pain    Post-op X-Ray: hardware intact w/o fracture    A/P: 63yMale s/p L TKA by Dr. ISAAK Sosa on 02-22  - Stable  - Pain Control  - DVT ppx: ASA 81 BID  - Post op abx: Ancef  - WBS: WBAT  - CODY

## 2024-02-25 NOTE — PROGRESS NOTE ADULT - SUBJECTIVE AND OBJECTIVE BOX
INTERVAL HPI/OVERNIGHT EVENTS:  Patient was seen and examined at bedside. As per nurse and patient, no o/n events, patient resting comfortably. No complaints at this time. Patient denies: fever, chills, dizziness, weakness, HA, Changes in vision, CP, palpitations, SOB, cough, N/V/D/C, dysuria, changes in bowel movements, LE edema. ROS otherwise negative.    VITAL SIGNS:  T(F): 97.8 (02-25-24 @ 05:02)  HR: 62 (02-25-24 @ 05:02)  BP: 115/68 (02-25-24 @ 05:02)  RR: 16 (02-25-24 @ 05:02)  SpO2: 97% (02-25-24 @ 05:02)  Wt(kg): --    PHYSICAL EXAM:    Constitutional: very pleasant WDWN, NAD  HEENT: PERRL, EOMI, sclera non-icteric, neck supple, trachea midline, no masses, no JVD, MMM, good dentition  Respiratory: CTA b/l, good air entry b/l, no wheezing, no rhonchi, no rales, without accessory muscle use and no intercostal retractions  Cardiovascular: RRR, normal S1S2, no M/R/G  Gastrointestinal: soft, NTND, no masses palpable, BS normal  Extremities: Warm, well perfused, pulses equal bilateral upper and lower extremities, no edema, no clubbing. Left leg in stocking  Neurological: AAOx3, CN Grossly intact  Skin: Normal temperature, warm, dry    MEDICATIONS  (STANDING):  acetaminophen     Tablet .. 1000 milliGRAM(s) Oral every 8 hours  aspirin  chewable 81 milliGRAM(s) Oral two times a day  celecoxib 200 milliGRAM(s) Oral every 12 hours  HYDROmorphone  Injectable 0.5 milliGRAM(s) IV Push every 15 minutes  lactated ringers. 1000 milliLiter(s) (100 mL/Hr) IV Continuous <Continuous>  pantoprazole    Tablet 40 milliGRAM(s) Oral before breakfast  polyethylene glycol 3350 17 Gram(s) Oral at bedtime  povidone iodine 5% Nasal Swab 1 Application(s) Both Nostrils once  senna 2 Tablet(s) Oral at bedtime    MEDICATIONS  (PRN):  bisacodyl Suppository 10 milliGRAM(s) Rectal once PRN Constipation  HYDROmorphone  Injectable 0.5 milliGRAM(s) IV Push every 4 hours PRN Breakthrough pain  magnesium hydroxide Suspension 30 milliLiter(s) Oral daily PRN Constipation  ondansetron Injectable 4 milliGRAM(s) IV Push every 6 hours PRN Nausea and/or Vomiting  oxyCODONE    IR 5 milliGRAM(s) Oral every 4 hours PRN Moderate Pain (4 - 6)  oxyCODONE    IR 10 milliGRAM(s) Oral every 4 hours PRN Severe Pain (7 - 10)      Allergies    No Known Allergies    Intolerances        LABS:                        11.5   8.23  )-----------( 219      ( 25 Feb 2024 05:30 )             35.6     02-25    137  |  99  |  17  ----------------------------<  85  4.8   |  28  |  0.92    Ca    9.1      25 Feb 2024 05:30        Urinalysis Basic - ( 25 Feb 2024 05:30 )    Color: x / Appearance: x / SG: x / pH: x  Gluc: 85 mg/dL / Ketone: x  / Bili: x / Urobili: x   Blood: x / Protein: x / Nitrite: x   Leuk Esterase: x / RBC: x / WBC x   Sq Epi: x / Non Sq Epi: x / Bacteria: x        RADIOLOGY & ADDITIONAL TESTS:  Reviewed

## 2024-02-26 ENCOUNTER — TRANSCRIPTION ENCOUNTER (OUTPATIENT)
Age: 64
End: 2024-02-26

## 2024-02-26 VITALS
OXYGEN SATURATION: 97 % | SYSTOLIC BLOOD PRESSURE: 130 MMHG | RESPIRATION RATE: 18 BRPM | TEMPERATURE: 98 F | DIASTOLIC BLOOD PRESSURE: 75 MMHG | HEART RATE: 67 BPM

## 2024-02-26 LAB
ANION GAP SERPL CALC-SCNC: 10 MMOL/L — SIGNIFICANT CHANGE UP (ref 5–17)
BUN SERPL-MCNC: 16 MG/DL — SIGNIFICANT CHANGE UP (ref 7–23)
CALCIUM SERPL-MCNC: 9.2 MG/DL — SIGNIFICANT CHANGE UP (ref 8.4–10.5)
CHLORIDE SERPL-SCNC: 102 MMOL/L — SIGNIFICANT CHANGE UP (ref 96–108)
CO2 SERPL-SCNC: 27 MMOL/L — SIGNIFICANT CHANGE UP (ref 22–31)
CREAT SERPL-MCNC: 0.94 MG/DL — SIGNIFICANT CHANGE UP (ref 0.5–1.3)
EGFR: 92 ML/MIN/1.73M2 — SIGNIFICANT CHANGE UP
GLUCOSE SERPL-MCNC: 92 MG/DL — SIGNIFICANT CHANGE UP (ref 70–99)
HCT VFR BLD CALC: 37.7 % — LOW (ref 39–50)
HGB BLD-MCNC: 12 G/DL — LOW (ref 13–17)
MCHC RBC-ENTMCNC: 29.1 PG — SIGNIFICANT CHANGE UP (ref 27–34)
MCHC RBC-ENTMCNC: 31.8 GM/DL — LOW (ref 32–36)
MCV RBC AUTO: 91.3 FL — SIGNIFICANT CHANGE UP (ref 80–100)
NRBC # BLD: 0 /100 WBCS — SIGNIFICANT CHANGE UP (ref 0–0)
PLATELET # BLD AUTO: 214 K/UL — SIGNIFICANT CHANGE UP (ref 150–400)
POTASSIUM SERPL-MCNC: 5.1 MMOL/L — SIGNIFICANT CHANGE UP (ref 3.5–5.3)
POTASSIUM SERPL-SCNC: 5.1 MMOL/L — SIGNIFICANT CHANGE UP (ref 3.5–5.3)
RBC # BLD: 4.13 M/UL — LOW (ref 4.2–5.8)
RBC # FLD: 12.7 % — SIGNIFICANT CHANGE UP (ref 10.3–14.5)
SODIUM SERPL-SCNC: 139 MMOL/L — SIGNIFICANT CHANGE UP (ref 135–145)
WBC # BLD: 8.82 K/UL — SIGNIFICANT CHANGE UP (ref 3.8–10.5)
WBC # FLD AUTO: 8.82 K/UL — SIGNIFICANT CHANGE UP (ref 3.8–10.5)

## 2024-02-26 PROCEDURE — 85027 COMPLETE CBC AUTOMATED: CPT

## 2024-02-26 PROCEDURE — 80048 BASIC METABOLIC PNL TOTAL CA: CPT

## 2024-02-26 PROCEDURE — 36415 COLL VENOUS BLD VENIPUNCTURE: CPT

## 2024-02-26 PROCEDURE — C1776: CPT

## 2024-02-26 PROCEDURE — 99233 SBSQ HOSP IP/OBS HIGH 50: CPT

## 2024-02-26 PROCEDURE — 97161 PT EVAL LOW COMPLEX 20 MIN: CPT

## 2024-02-26 PROCEDURE — C1713: CPT

## 2024-02-26 PROCEDURE — 97116 GAIT TRAINING THERAPY: CPT

## 2024-02-26 PROCEDURE — S2900: CPT

## 2024-02-26 PROCEDURE — 97530 THERAPEUTIC ACTIVITIES: CPT

## 2024-02-26 PROCEDURE — 73560 X-RAY EXAM OF KNEE 1 OR 2: CPT

## 2024-02-26 RX ADMIN — CELECOXIB 200 MILLIGRAM(S): 200 CAPSULE ORAL at 05:00

## 2024-02-26 RX ADMIN — Medication 81 MILLIGRAM(S): at 17:32

## 2024-02-26 RX ADMIN — CELECOXIB 200 MILLIGRAM(S): 200 CAPSULE ORAL at 17:32

## 2024-02-26 RX ADMIN — Medication 1000 MILLIGRAM(S): at 05:00

## 2024-02-26 RX ADMIN — OXYCODONE HYDROCHLORIDE 5 MILLIGRAM(S): 5 TABLET ORAL at 17:33

## 2024-02-26 RX ADMIN — Medication 81 MILLIGRAM(S): at 05:01

## 2024-02-26 RX ADMIN — PANTOPRAZOLE SODIUM 40 MILLIGRAM(S): 20 TABLET, DELAYED RELEASE ORAL at 05:01

## 2024-02-26 RX ADMIN — OXYCODONE HYDROCHLORIDE 5 MILLIGRAM(S): 5 TABLET ORAL at 18:33

## 2024-02-26 RX ADMIN — Medication 1000 MILLIGRAM(S): at 13:11

## 2024-02-26 RX ADMIN — CELECOXIB 200 MILLIGRAM(S): 200 CAPSULE ORAL at 06:00

## 2024-02-26 RX ADMIN — Medication 1000 MILLIGRAM(S): at 06:00

## 2024-02-26 NOTE — DISCHARGE NOTE NURSING/CASE MANAGEMENT/SOCIAL WORK - NSDCPEFALRISK_GEN_ALL_CORE
05/16/2023                 [facility]  [address]  [place] [address]  Phone: [phone]  Fax: [phone]   05/16/2023    Patient: Aaliyah B Sandifer   YOB: 2011   Date of Visit: 5/16/2023       To Whom it May Concern:    Aaliyah Sandifer was seen in my clinic on 5/16/2023. She may return to school on 5/17/23 .    If you have any questions or concerns, please don't hesitate to call.    Sincerely,           Leonora Hawk MD      For information on Fall & Injury Prevention, visit: [email or web address] OR  [email or web address] OR  https://www.cdc.gov/steadi/patient.html

## 2024-02-26 NOTE — PROGRESS NOTE ADULT - REASON FOR ADMISSION
Left knee pain

## 2024-02-26 NOTE — PROGRESS NOTE ADULT - SUBJECTIVE AND OBJECTIVE BOX
Subjective:  No acute events overnight.  Patient is doing well today without new complaints. Pain is well controlled. Denies HA, CP, SOB, abdominal pain, nausea, vomiting, fever, chills or diarrhea.     Objective:   Vital Signs Last 24 Hrs  T(C): 37.1 (26 Feb 2024 09:01), Max: 37.1 (26 Feb 2024 09:01)  T(F): 98.8 (26 Feb 2024 09:01), Max: 98.8 (26 Feb 2024 09:01)  HR: 68 (26 Feb 2024 09:01) (68 - 79)  BP: 135/79 (26 Feb 2024 09:01) (125/68 - 138/76)  BP(mean): 98 (26 Feb 2024 09:01) (98 - 98)  RR: 17 (26 Feb 2024 09:01) (16 - 17)  SpO2: 97% (26 Feb 2024 09:01) (96% - 99%)    Parameters below as of 26 Feb 2024 09:01  Patient On (Oxygen Delivery Method): room air    Physical Exam:   -Gen: NAD, resting in bed  -HEENT: EOMI, PERRL, no scleral icterus  -CV: normal S1 and S2  -Lungs: CTABL, normal respiratory effort on RA  -Ab: soft, NT, ND, normal BS  -Ext: no LE edema, left knee dressing c/d/i  -Neuro: A&O x 3, no focal deficits     Labs:                        12.0   8.82  )-----------( 214      ( 26 Feb 2024 05:30 )             37.7       02-26    139  |  102  |  16  ----------------------------<  92  5.1   |  27  |  0.94    Ca    9.2      26 Feb 2024 05:30    Medications:  MEDICATIONS  (STANDING):  acetaminophen     Tablet .. 1000 milliGRAM(s) Oral every 8 hours  aspirin  chewable 81 milliGRAM(s) Oral two times a day  celecoxib 200 milliGRAM(s) Oral every 12 hours  HYDROmorphone  Injectable 0.5 milliGRAM(s) IV Push every 15 minutes  lactated ringers. 1000 milliLiter(s) (100 mL/Hr) IV Continuous <Continuous>  pantoprazole    Tablet 40 milliGRAM(s) Oral before breakfast  polyethylene glycol 3350 17 Gram(s) Oral at bedtime  povidone iodine 5% Nasal Swab 1 Application(s) Both Nostrils once  senna 2 Tablet(s) Oral at bedtime    MEDICATIONS  (PRN):  bisacodyl Suppository 10 milliGRAM(s) Rectal once PRN Constipation  HYDROmorphone  Injectable 0.5 milliGRAM(s) IV Push every 4 hours PRN Breakthrough pain  magnesium hydroxide Suspension 30 milliLiter(s) Oral daily PRN Constipation  ondansetron Injectable 4 milliGRAM(s) IV Push every 6 hours PRN Nausea and/or Vomiting  oxyCODONE    IR 5 milliGRAM(s) Oral every 4 hours PRN Moderate Pain (4 - 6)  oxyCODONE    IR 10 milliGRAM(s) Oral every 4 hours PRN Severe Pain (7 - 10)

## 2024-02-26 NOTE — PROGRESS NOTE ADULT - ASSESSMENT
63-year-old male with a PMHx of pre-diabetes who presented for elective left TKA 2/22.      #Left Knee OA    -further management as per ortho, s/p left TKA on 2/22    -pain control, bowel regimen and IS    -PT recommends CODY   -DVT PPx: ASA BID      #Pre-Diabetes    -outpatient PCP follow up, glucose well controlled this admission      DVT PPx: ASA    Dispo: CODY
63-year-old male with a PMHx of pre-diabetes who presented for elective left TKA 2/22. Doing well, pending LIVAN     #Left Knee OA   -further management as per ortho, s/p left TKA on 2/22   -pain control, bowel regimen and IS   -pending PT L livan  -DVT PPx: ASA BID     #Leukocytosis    -likely reactive post-op, monitor off Abx   -Resolved    #Pre-Diabetes   FSG controlled   -outpatient PCP follow up      DVT PPx: ASA BID    Dispo: LIVAN
63-year-old male with a PMHx of pre-diabetes who presented for elective left TKA.     #Left Knee OA   -further management as per ortho, s/p left TKA on 2/22   -pain control, bowel regimen and IS   -pending PT evaluation    -DVT PPx: ASA BID     #Leukocytosis    -likely reactive post-op, monitor off Abx     #Pre-Diabetes   -outpatient PCP follow up    DVT PPx: ASA BID    Dispo: pending PT

## 2024-02-26 NOTE — PROGRESS NOTE ADULT - SUBJECTIVE AND OBJECTIVE BOX
Orthopaedic Surgery Progress Note    Subjective:     Patient seen and examined. Patient comfortable without complaints, pain controlled.      Objective:    Vital Signs Last 24 Hrs  T(C): 37.1 (02-26-24 @ 09:01), Max: 37.1 (02-26-24 @ 09:01)  T(F): 98.8 (02-26-24 @ 09:01), Max: 98.8 (02-26-24 @ 09:01)  HR: 68 (02-26-24 @ 09:01) (68 - 79)  BP: 135/79 (02-26-24 @ 09:01) (135/79 - 136/81)  BP(mean): 98 (02-26-24 @ 09:01) (98 - 98)  RR: 17 (02-26-24 @ 09:01) (16 - 17)  SpO2: 97% (02-26-24 @ 09:01) (97% - 98%)  AVSS    PE:  General: Patient alert and oriented, NAD  Dressing: Clean/dry/intact left knee aqucel  Sensation: intact to bilateral lower extremities   Motor: EHL/FHL/TA/GS 5/5 bilateral lower extremities                           12.0   8.82  )-----------( 214      ( 26 Feb 2024 05:30 )             37.7   02-26    139  |  102  |  16  ----------------------------<  92  5.1   |  27  |  0.94    Ca    9.2      26 Feb 2024 05:30        A/P: 63yMale s/p left knee removal of hardware s/p left total knee replacement   1. Pain control as needed  2. DVT prophylaxis: ASA  3. PT, weight-bearing status: wbat   4. Dispo: pending insurance authorization for rehab

## 2024-02-26 NOTE — PROGRESS NOTE ADULT - PROVIDER SPECIALTY LIST ADULT
Internal Medicine
Orthopedics
Internal Medicine
Orthopedics
Orthopedics
Hospitalist

## 2024-02-26 NOTE — DISCHARGE NOTE NURSING/CASE MANAGEMENT/SOCIAL WORK - PATIENT PORTAL LINK FT
You can access the FollowMyHealth Patient Portal offered by [facility] by registering at the following website: [email or web address]. By joining [employer]’s FollowMyHealth portal, you will also be able to view your health information using other applications (apps) compatible with our system.

## 2024-03-05 DIAGNOSIS — M17.12 UNILATERAL PRIMARY OSTEOARTHRITIS, LEFT KNEE: ICD-10-CM

## 2024-03-05 DIAGNOSIS — R73.03 PREDIABETES: ICD-10-CM

## 2024-03-08 ENCOUNTER — APPOINTMENT (OUTPATIENT)
Dept: ORTHOPEDIC SURGERY | Facility: CLINIC | Age: 64
End: 2024-03-08
Payer: MEDICAID

## 2024-03-08 VITALS
DIASTOLIC BLOOD PRESSURE: 81 MMHG | OXYGEN SATURATION: 99 % | HEIGHT: 67 IN | BODY MASS INDEX: 29.03 KG/M2 | WEIGHT: 185 LBS | SYSTOLIC BLOOD PRESSURE: 142 MMHG | HEART RATE: 70 BPM

## 2024-03-08 DIAGNOSIS — M17.12 UNILATERAL PRIMARY OSTEOARTHRITIS, LEFT KNEE: ICD-10-CM

## 2024-03-08 PROCEDURE — 99024 POSTOP FOLLOW-UP VISIT: CPT

## 2024-03-11 NOTE — HISTORY OF PRESENT ILLNESS
[de-identified] : Patient is a 62 y/o male who is following up for hist first post-operative visit following left total knee arthroplasty. He reports he is doing well. he is still recovering at a subacute rehabilitation facility. He is ambulating, at this point, without the use of an assistive device. He does report that he feels that the recovery from the left side is proceeding more easily than the recovery on his right side. Overall, he is happy with the progress this far.  [de-identified] : first post-op surgical skyler feb-  [de-identified] : Gait: Mild cautious on the left but not antalgic, he ambulates without the use of any assistive device.  Left knee: - Focal soft tissue swelling: none - Ecchymosis: none - Erythema: none - Effusion: moderate, no palpable Baker's cyst - Wounds: well-healed midline incision with minimal alexandria incisional scab formation throughout, overall benign appearing. Well-healed tibial stab incision, the sutures have apparently already been removed.  - Alignment: normal - Tenderness: none - ROM: 0-100 - Collateral laxity: none - Cruciate laxity: none - Popliteal angle (degrees): - Quad strength: 5/5 [de-identified] : Imp: 64 y/o male 2 weeks status post left total knee arthroplasty, overall doing very well at this time.  - I encouraged him to continue with physical therapy sessions for as long as he remains at his facility.  - I provided him with a referral to outpatient physical therapy for him to use once he gets discharged home.  - He will follow-up next in 4 weeks with left knee x-rays.

## 2024-03-11 NOTE — END OF VISIT
[FreeTextEntry3] : Documented by Breanne Valles acting a as a scribe for Dr. Nickolas Sosa. 03/08/2024.  All medical record entries made by the Scribe were at my, Dr. Nickolas Sosa, direction and personally dictated by me on 03/08/2024. I have reviewed the chart and agree that the record accurately reflects my personal performance of the history, physical exam, assessment and plan. I have also personally directed, reviewed, and agreed with the chart.

## 2024-04-05 ENCOUNTER — APPOINTMENT (OUTPATIENT)
Dept: ORTHOPEDIC SURGERY | Facility: CLINIC | Age: 64
End: 2024-04-05
Payer: MEDICAID

## 2024-04-05 ENCOUNTER — OUTPATIENT (OUTPATIENT)
Dept: OUTPATIENT SERVICES | Facility: HOSPITAL | Age: 64
LOS: 1 days | End: 2024-04-05
Payer: MEDICAID

## 2024-04-05 ENCOUNTER — RESULT REVIEW (OUTPATIENT)
Age: 64
End: 2024-04-05

## 2024-04-05 VITALS
HEIGHT: 67 IN | HEART RATE: 79 BPM | WEIGHT: 185 LBS | DIASTOLIC BLOOD PRESSURE: 74 MMHG | BODY MASS INDEX: 29.03 KG/M2 | SYSTOLIC BLOOD PRESSURE: 125 MMHG | OXYGEN SATURATION: 96 %

## 2024-04-05 DIAGNOSIS — Z87.81 PERSONAL HISTORY OF (HEALED) TRAUMATIC FRACTURE: Chronic | ICD-10-CM

## 2024-04-05 DIAGNOSIS — Z98.890 OTHER SPECIFIED POSTPROCEDURAL STATES: Chronic | ICD-10-CM

## 2024-04-05 PROCEDURE — 99024 POSTOP FOLLOW-UP VISIT: CPT

## 2024-04-05 PROCEDURE — 73562 X-RAY EXAM OF KNEE 3: CPT | Mod: 26,LT

## 2024-04-05 PROCEDURE — 73562 X-RAY EXAM OF KNEE 3: CPT

## 2024-04-08 NOTE — HISTORY OF PRESENT ILLNESS
[de-identified] : Second post-op surgical skyler feb- .   [de-identified] : 4/5/2024 62 y/o M presenting about 6 weeks S/P left TKA overall doing very well today. He states that he left the rehab facility yesterday and is planning to start outpatient PT. Overall reports mild pain. Presents with a cane today. [de-identified] : Gait: He presents today with a cane, demonstrating minimal left-sided caution and antalgia. Left knee: - Focal soft tissue swelling: none - Ecchymosis: none - Erythema: none - Effusion: small-moderate, no palpable Baker's cyst  - Wounds: well-healed midline incision, benign appearing   - Alignment: minimal varus - Tenderness: none - ROM: 0-120 - Collateral laxity: none - Cruciate laxity: none - Popliteal angle (degrees): 20 - Quad strength: 5/5 [de-identified] : Left knee x-ray were repeated today April 5, 2024 at [facility]. These demonstrate stable appearance of his left total knee replacement as compared to prior imaging with evidence of mechanical complication. Patella sits at normal height and tracks centrally.  [de-identified] : Imp: 62 y/o male now 6 weeks status post left total knee arthroplasty with previously well-functioning right total knee replacement overall doing well at this time.  - He will continue with outpatient physical therapy as well as home exercise program.  - I encourage him to wean off the use of his cane as soon as possible.  - He will follow up with me 2 months with repeat left knee x-rays.

## 2024-04-08 NOTE — ADDENDUM
[FreeTextEntry1] : I, Breanne Valles, acted as a scribe on behalf of Dr. Sosa 04/05/2024.  I, Nico Jules, acted as a scribe on behalf of Dr. Sosa 04/05/2024.

## 2024-04-08 NOTE — END OF VISIT
[FreeTextEntry3] : Documented by Breanne Valles acting a as a scribe for Dr. Nickolas Sosa. 04/05/2024.  All medical record entries made by the Scribe were at my, Dr. Nickolas Sosa, direction and personally dictated by me on 04/05/2024. I have reviewed the chart and agree that the record accurately reflects my personal performance of the history, physical exam, assessment and plan. I have also personally directed, reviewed, and agreed with the chart.

## 2024-05-31 ENCOUNTER — RESULT REVIEW (OUTPATIENT)
Age: 64
End: 2024-05-31

## 2024-05-31 ENCOUNTER — APPOINTMENT (OUTPATIENT)
Dept: ORTHOPEDIC SURGERY | Facility: CLINIC | Age: 64
End: 2024-05-31
Payer: MEDICAID

## 2024-05-31 ENCOUNTER — OUTPATIENT (OUTPATIENT)
Dept: OUTPATIENT SERVICES | Facility: HOSPITAL | Age: 64
LOS: 1 days | End: 2024-05-31
Payer: MEDICAID

## 2024-05-31 VITALS
SYSTOLIC BLOOD PRESSURE: 124 MMHG | HEIGHT: 67 IN | HEART RATE: 88 BPM | BODY MASS INDEX: 29.82 KG/M2 | OXYGEN SATURATION: 99 % | WEIGHT: 190 LBS | DIASTOLIC BLOOD PRESSURE: 82 MMHG

## 2024-05-31 DIAGNOSIS — Z47.1 AFTERCARE FOLLOWING JOINT REPLACEMENT SURGERY: ICD-10-CM

## 2024-05-31 DIAGNOSIS — Z98.890 OTHER SPECIFIED POSTPROCEDURAL STATES: Chronic | ICD-10-CM

## 2024-05-31 DIAGNOSIS — Z87.81 PERSONAL HISTORY OF (HEALED) TRAUMATIC FRACTURE: Chronic | ICD-10-CM

## 2024-05-31 DIAGNOSIS — Z96.652 AFTERCARE FOLLOWING JOINT REPLACEMENT SURGERY: ICD-10-CM

## 2024-05-31 PROCEDURE — 73564 X-RAY EXAM KNEE 4 OR MORE: CPT

## 2024-05-31 PROCEDURE — 73564 X-RAY EXAM KNEE 4 OR MORE: CPT | Mod: 26,LT

## 2024-05-31 PROCEDURE — 99213 OFFICE O/P EST LOW 20 MIN: CPT

## 2024-06-04 NOTE — HISTORY OF PRESENT ILLNESS
[de-identified] : R TKA 4/10/23 L TKA 02/22/2024 05/31/2024: 64 year old male 3 months s/p left total knee arthroplasty, overall doing well. He reports he is having no pain but is taking Tylenol as needed. He is active with cycling and light running. He has been participating in PT. He has no new complaints today.   10/27/23: 62 y/o male following up for right total knee replacement and left knee osteoarthritis status post left tibia intramedullary nailing. Now approximately 10 months out from his left tibia surgery and approximately 6 months out from his right knee replacement. He is doing very well with respect to the right knee, and he has no complaints with it. He notes that the daily pain that he experiences with it is, at a maximum, a 1/10. He notes that the left knee cortisone injection that we administered 3 months ago was somewhat effective for relieving his pain, but he does complain of moderate to severe pain on a regular basis. He uses ibuprofen as needed, but he tries not to use it every day. He does complain of ongoing buckling episodes of the left knee and is inquiring as to next steps in management of his left knee arthritis.   07/26/2023: 62 y/o male following up now on his third post-operative visit following right total knee arthroplasty performed on April 10, 2023. He reports that he is doing very well with respect to the right knee and is minimal to no pain which is well controlled with intermittent doses of Tylenol. He is able to walk up to 15 blocks at a time and notes that currently his main functional barrier is his progressively increasing left knee osteoarthritis pain. He would like to receive a left knee cortisone injection today.  05/24/2023: 62 y/o male presenting about 6 weeks s/p right TKA overall doing well. He has been participating in PT currently and is taking Tylenol as needed for pain. Overall, his right knee is improving. He does have left knee instability and occasional buckling. He is considering left total knee replacement but not in the immediate future. He would like a left knee brace until he is able to move forward with surgery for the left knee.   03/22/2023: 61 y/o Macedonian speaking male presenting for evaluation of right knee pain. He has been having this pain for the past 5 years and it is progressively worsening. He rates his pain as 8/10. He localizes the pain to the entire knee, worse with walking, and is taking Tylenol and ibuprofen as needed for pain. He notes a walking distance limitation of up to 3 blocks with a cane before having to stop and take a break. Past interventions include PT over 2 years ago which did not provide good relief. He has had 3 right knee CSI injections 2 years ago which also did not provide much relief. He would like to discuss right TKA today.  PMHx includes left knee fracture 3 months ago following a misstep on stairs, prediabetes. He denies allergies and he currently lives alone in a house.

## 2024-06-04 NOTE — REASON FOR VISIT
[Follow-Up Visit] : a follow-up visit for [Other: ____] : [unfilled] [Interpreters_IDNumber] : [id number] [Interpreters_FullName] : Blake

## 2024-06-04 NOTE — PHYSICAL EXAM
[de-identified] : General appearance: well nourished and hydrated, pleasant, alert and oriented x 3, cooperative. HEENT: normocephalic, EOM intact, wearing mask, external auditory canal clear. Cardiovascular: no lower leg edema, no varicosities, dorsalis pedis pulses palpable and symmetric. Lymphatics: no palpable lymphadenopathy, no lymphedema. Neurologic: sensation is normal, no muscle weakness in upper or lower extremities, patella tendon reflexes present and symmetric. Dermatologic: skin moist, warm, no rash. Spine: cervical spine with normal lordosis and painless range of motion, thoracic spine with normal kyphosis and painless range of motion, lumbosacral spine with normal lordosis and painless range of motion.  No tenderness to palpation along midline spine and paraspinal musculature.  Sacroiliac joints nontender bilaterally. Negative SLR and crossed SLR tests bilaterally. Gait: normal.   Left knee: - Focal soft tissue swelling: none - Ecchymosis: none - Erythema: none - Effusion: none - Wounds: well-healed anterior midline incision, benign appearing - Alignment: normal - Tenderness: none - ROM: 0-120 - Collateral laxity: none - Cruciate laxity: none - Quad strength: 5/5 [de-identified] : Left knee XRs taken today demonstrate stable appearance of his left TKA without evidence of mechanical complication. Patella sits at appropriate height and tracks centrally.

## 2024-06-04 NOTE — DISCUSSION/SUMMARY
[de-identified] : IMP: 64 year old male 3 months s/p left total knee arthroplasty. - He will follow up annually every February with repeat bilateral knee XRs, sooner as needed for any new or worsening symptoms.

## 2024-06-04 NOTE — ADDENDUM
[FreeTextEntry1] : I, Monique Baron, documented this note as a scribe on behalf of Dr. Nickolas Sosa on 05/31/2024.

## 2024-06-04 NOTE — END OF VISIT
[FreeTextEntry3] : All medical record entries made by the Scribe were at my, Dr. Nickolas Sosa, direction and personally dictated by me on 05/31/2024. I have reviewed the chart and agree that the record accurately reflects my personal performance of the history, physical exam, assessment and plan. I have alsopersonally directed, reviewed, and agreed with the chart.

## 2024-08-05 ENCOUNTER — NON-APPOINTMENT (OUTPATIENT)
Age: 64
End: 2024-08-05

## 2024-08-07 NOTE — REASON FOR VISIT
Physical Therapy      Patient not seen in therapy today.     Patient with other health care provider and visitors, requesting no interruption    Physical therapy order received, chart reviewed. Attempted to see patient for physical therapy evaluation although patient at bedside with family and medical staff for meeting regarding medical plan of care including plan for surgery tomorrow. RN reports additional medical staff coming to bedside soon to further discuss plan with patient and family. RN reports patient overall weak but completing bed mobility and transfers with assist for line management only.     Will attempt to see patient again at next opportunity as medically appropriate. RN aware.                              Therapy procedure time and total treatment time can be found documented on the Time Entry flowsheet   [Initial Visit] : an initial visit for [Other: ____] : [unfilled]

## 2025-02-21 ENCOUNTER — APPOINTMENT (OUTPATIENT)
Dept: ORTHOPEDIC SURGERY | Facility: CLINIC | Age: 65
End: 2025-02-21

## (undated) DEVICE — SUT STRATAFIX SPIRAL MONOCRYL PLUS 3-0 30CM PS-1 UNDYED

## (undated) DEVICE — ELCTR BOVIE PENCIL HANDPIECE ROCKER SWITCH 15FT

## (undated) DEVICE — STRYKER 4-PORT MANIFOLD W/SPECIMEN COLLECTION

## (undated) DEVICE — BLADE SCALPEL SAFETYLOCK #15

## (undated) DEVICE — SYR ASEPTO

## (undated) DEVICE — DRAPE 3/4 SHEET 52X76"

## (undated) DEVICE — DRAPE U POLY BLUE 60X72"

## (undated) DEVICE — DRSG GAUZE MOISTURIZER 0.5 OZ 4X8

## (undated) DEVICE — SYR LUER LOK 50CC

## (undated) DEVICE — SUT ETHILON 3-0 18" PS-1

## (undated) DEVICE — BLADE SCALPEL SAFETYLOCK #10

## (undated) DEVICE — DRSG XEROFORM 1 X 8"

## (undated) DEVICE — SUT VICRYL 2-0 27" CT-1 UNDYED

## (undated) DEVICE — ELCTR AQUAMANTYS BIPOLAR SEALER 6.0

## (undated) DEVICE — DRAPE TOWEL BLUE 17" X 24"

## (undated) DEVICE — SUT STRATAFIX SPIRAL PDS PLUS 0 23X23CM CP-2 VIOLET

## (undated) DEVICE — VENODYNE/SCD SLEEVE CALF MEDIUM

## (undated) DEVICE — GLV 7.5 PROTEXIS ORTHO (CREAM)

## (undated) DEVICE — DRSG STOCKINETTE IMPERVIOUS XL

## (undated) DEVICE — PACK TOTAL KNEE

## (undated) DEVICE — GLV 7 PROTEXIS ORTHO (CREAM)

## (undated) DEVICE — POSITIONER FOAM EGG CRATE ULNAR 2PCS (PINK)

## (undated) DEVICE — DRSG COBAN 6"

## (undated) DEVICE — SUT VICRYL 0 36" CT-1 UNDYED

## (undated) DEVICE — TUBING SUCTION NONCONDUCTIVE 6MM X 12FT

## (undated) DEVICE — SUT STRATAFIX SYMMETRIC PDS 1 45CM OS-6

## (undated) DEVICE — ELCTR BOVIE PENCIL BLADE 10FT

## (undated) DEVICE — WARMING BLANKET UPPER ADULT

## (undated) DEVICE — ROSA DRAPE ROBOTIC UNIT

## (undated) DEVICE — NDL 18G BLUNT FILL PINK

## (undated) DEVICE — SAW BLADE STRYKER SAGITTAL 25X86.5X1.32MM

## (undated) DEVICE — SURGIPHOR STERILE WOUND IRR POVIDONE IODINE

## (undated) DEVICE — WOUND IRR IRRISEPT W 0.5 CHG

## (undated) DEVICE — GLV 7 PROTEXIS (WHITE)

## (undated) DEVICE — DRSG TEGADERM 4X4.75"

## (undated) DEVICE — DRSG 2X2

## (undated) DEVICE — POLISHER OR CAUTERY TIP

## (undated) DEVICE — NDL HYPO SAFE 22G X 1.5" (BLACK)

## (undated) DEVICE — DRAPE STERI-DRAPE INCISE 32X33"

## (undated) DEVICE — DRAPE 1/2 SHEET 40X57"

## (undated) DEVICE — SAW BLADE STRYKER SAGITTAL DUAL CUT TEETH 35X64X.64MM

## (undated) DEVICE — LAP PAD 18 X 18"

## (undated) DEVICE — SAW BLADE STRYKER SAGITTAL 81.5X12.5X1.19MM

## (undated) DEVICE — HOOD T5 PEELAWAY

## (undated) DEVICE — ROSA NAVITRACKER KIT KNEE/SPINE

## (undated) DEVICE — DRAPE LIGHT HANDLE COVER (BLUE)

## (undated) DEVICE — SUT STRATAFIX SPIRAL PDO 1 30CM OS-6

## (undated) DEVICE — WOUND IRR SURGIPHOR

## (undated) DEVICE — ELCTR STRYKER NEPTUNE SMOKE EVACUATION PENCIL (GREEN)

## (undated) DEVICE — GLV 8 PROTEXIS (WHITE)

## (undated) DEVICE — DRSG AQUACEL 3.5 X 10"

## (undated) DEVICE — SUT STRATAFIX SPIRAL PDO 0 24CM CP-2

## (undated) DEVICE — PREP DURAPREP 26CC

## (undated) DEVICE — STAPLER SKIN PROXIMATE

## (undated) DEVICE — PACK BASIC

## (undated) DEVICE — DRSG DERMABOND PRINEO 22CM

## (undated) DEVICE — SPECIMEN CONTAINER 4OZ

## (undated) DEVICE — MARKING PEN W RULER

## (undated) DEVICE — SUCTION YANKAUER NO CONTROL VENT

## (undated) DEVICE — GLV 7.5 PROTEXIS (WHITE)

## (undated) DEVICE — DRAPE C ARM 41X74"

## (undated) DEVICE — PREP CHLORAPREP HI-LITE ORANGE 26ML

## (undated) DEVICE — DRAPE SHOWER CURTAIN ISOLATION